# Patient Record
Sex: MALE | Race: WHITE | NOT HISPANIC OR LATINO | Employment: FULL TIME | URBAN - METROPOLITAN AREA
[De-identification: names, ages, dates, MRNs, and addresses within clinical notes are randomized per-mention and may not be internally consistent; named-entity substitution may affect disease eponyms.]

---

## 2019-09-03 ENCOUNTER — OFFICE VISIT (OUTPATIENT)
Dept: FAMILY MEDICINE CLINIC | Facility: CLINIC | Age: 54
End: 2019-09-03

## 2019-09-03 VITALS
TEMPERATURE: 97.6 F | OXYGEN SATURATION: 97 % | SYSTOLIC BLOOD PRESSURE: 121 MMHG | HEIGHT: 70 IN | BODY MASS INDEX: 24.62 KG/M2 | DIASTOLIC BLOOD PRESSURE: 58 MMHG | WEIGHT: 172 LBS | HEART RATE: 66 BPM

## 2019-09-03 DIAGNOSIS — M54.12 CERVICAL NEURITIS: ICD-10-CM

## 2019-09-03 DIAGNOSIS — R63.5 WEIGHT GAIN: ICD-10-CM

## 2019-09-03 DIAGNOSIS — B35.3 TINEA PEDIS OF RIGHT FOOT: ICD-10-CM

## 2019-09-03 DIAGNOSIS — Z00.00 HEALTHCARE MAINTENANCE: Primary | ICD-10-CM

## 2019-09-03 PROBLEM — F43.22 ADJUSTMENT DISORDER WITH ANXIETY: Status: ACTIVE | Noted: 2019-09-03

## 2019-09-03 PROCEDURE — 90632 HEPA VACCINE ADULT IM: CPT | Performed by: FAMILY MEDICINE

## 2019-09-03 PROCEDURE — 90471 IMMUNIZATION ADMIN: CPT | Performed by: FAMILY MEDICINE

## 2019-09-03 PROCEDURE — 99396 PREV VISIT EST AGE 40-64: CPT | Performed by: FAMILY MEDICINE

## 2019-09-03 RX ORDER — GABAPENTIN 600 MG/1
600 TABLET ORAL 3 TIMES DAILY
Refills: 1 | COMMUNITY
Start: 2019-07-07 | End: 2020-03-03 | Stop reason: SDUPTHER

## 2019-09-03 RX ORDER — ESCITALOPRAM OXALATE 10 MG/1
10 TABLET ORAL DAILY
Refills: 1 | COMMUNITY
Start: 2019-08-27 | End: 2019-10-15 | Stop reason: SDUPTHER

## 2019-09-03 NOTE — PROGRESS NOTES
Subjective   Jag Roche is a 54 y.o. male who presents for annual male wellness exam.  Chief Complaint   Patient presents with   • Annual Exam     Here for his routine annual exam.  He has some paperwork needs filled out for work.  As far as his cervical neuritis he still gets by fine with 1 gabapentin daily.  He has no complaints today even his anxiety is still under good control with the Lexapro.  He denies any suicidal homicidal ideation.  He is doing well except for a little more sedentary lifestyle with work.  He has gained a little weight from it.  Sexual History:  Yes, No ED  Contraception: Partner hysterectomy  Diet: healthy  Exercise: Yes  Do you feel safe? yes  Have you ever been abused? no  Last dental exam: this year  Eye exam: this year    Colon Cancer Screening: cscope at 49  PSA: 4/19      Immunization History   Administered Date(s) Administered   • Flu Vaccine Intradermal Quad 18-64YR 09/01/2016, 11/06/2017, 10/17/2018   • Hepatitis A 08/02/2018       The following portions of the patient's history were reviewed and updated as appropriate: allergies, current medications, past family history, past medical history, past social history, past surgical history and problem list.    Past Medical History:   Diagnosis Date   • Acute bronchitis    • Acute sinusitis    • Acute upper respiratory infection    • Adjustment disorder with anxiety    • Allergic rhinitis    • Cervical neuritis    • Chest pain    • Dark stools    • Degenerative lumbar disc    • Diarrhea    • Dyspepsia    • Food sticks on swallowing    • H/O degenerative disc disease    • History of renal calculi    • Increased urinary frequency    • Long-term use of high-risk medication    • Lumbar radiculitis    • Nocturia    • Pain in joint of right shoulder    • Pain in joint, hand    • Right shoulder pain    • Subconjunctival hemorrhage    • Swelling of hand joint, right    • Urinary frequency    • Vitamin B12 deficiency    • [place] teeth  extracted        History reviewed. No pertinent surgical history.    Family History   Problem Relation Age of Onset   • Other Mother         brain stem tumor   • Hypertension Mother    • Diabetes Mother    • Lung cancer Father    • Cancer Other         urinary   • Colon cancer Other        Social History     Socioeconomic History   • Marital status:      Spouse name: Not on file   • Number of children: Not on file   • Years of education: Not on file   • Highest education level: Not on file   Tobacco Use   • Smoking status: Never Smoker         Current Outpatient Medications:   •  escitalopram (LEXAPRO) 10 MG tablet, Take 10 mg by mouth Daily., Disp: , Rfl: 1  •  gabapentin (NEURONTIN) 600 MG tablet, Take 600 mg by mouth 3 (Three) Times a Day., Disp: , Rfl: 1    Review of Systems   Constitutional: Negative for chills, fatigue and fever.   HENT: Negative for congestion, rhinorrhea and sore throat.    Respiratory: Negative for cough and shortness of breath.    Cardiovascular: Negative for chest pain and leg swelling.   Gastrointestinal: Negative for abdominal pain.   Endocrine: Negative for polydipsia and polyuria.   Genitourinary: Negative for dysuria.   Musculoskeletal: Positive for neck pain and neck stiffness. Negative for arthralgias and myalgias.   Skin: Positive for rash.   Neurological: Negative for dizziness.   Hematological: Does not bruise/bleed easily.   Psychiatric/Behavioral: Negative for sleep disturbance.       Objective   Vitals:    09/03/19 1413   BP: 121/58   Pulse: 66   Temp: 97.6 °F (36.4 °C)   SpO2: 97%     Body mass index is 24.68 kg/m².  Physical Exam   Constitutional: He is oriented to person, place, and time. He appears well-developed and well-nourished. No distress.   HENT:   Head: Normocephalic and atraumatic.   Mouth/Throat: Oropharynx is clear and moist.   Eyes: Conjunctivae are normal. Pupils are equal, round, and reactive to light.   Neck: Neck supple. No thyromegaly present.    Cardiovascular: Normal rate and regular rhythm.   No murmur heard.  Pulmonary/Chest: Effort normal and breath sounds normal.   Musculoskeletal: He exhibits no edema.   Neurological: He is alert and oriented to person, place, and time.   Skin: Skin is warm and dry. Rash noted.   Erythema and cracking right foot interdigital space   Psychiatric: He has a normal mood and affect.         Assessment/Plan   Jag was seen today for annual exam.    Diagnoses and all orders for this visit:    Healthcare maintenance  -     Comprehensive Metabolic Panel  -     CBC & Differential  -     Lipid Panel  -     Hepatitis A Vaccine Adult IM  -     TSH    Cervical neuritis    Weight gain  -     TSH    Tinea pedis of right foot               Discussed the importance of maintaining a healthy weight and getting regular exercise.  Educated patient on the benefits of healthy diet.  Advise follow-up annually for wellness exams.    EMR Dragon/transcription disclaimer:  Much of this encounter note is an electronic transcription of spoken language to printed text.  The electronic translation of spoken language may permit errors, omissions or at times nonsensical words or phrases to be inadvertently transcribed.  Although I have reviewed the note for such errors, some may still exist.  Please contact the office for any clarification.

## 2019-09-04 LAB
ALBUMIN SERPL-MCNC: 4.4 G/DL (ref 3.5–5.5)
ALBUMIN/GLOB SERPL: 2 {RATIO} (ref 1.2–2.2)
ALP SERPL-CCNC: 71 IU/L (ref 39–117)
ALT SERPL-CCNC: 9 IU/L (ref 0–44)
AST SERPL-CCNC: 23 IU/L (ref 0–40)
BASOPHILS # BLD AUTO: 0 X10E3/UL (ref 0–0.2)
BASOPHILS NFR BLD AUTO: 0 %
BILIRUB SERPL-MCNC: 0.3 MG/DL (ref 0–1.2)
BUN SERPL-MCNC: 29 MG/DL (ref 6–24)
BUN/CREAT SERPL: 29 (ref 9–20)
CALCIUM SERPL-MCNC: 9.4 MG/DL (ref 8.7–10.2)
CHLORIDE SERPL-SCNC: 104 MMOL/L (ref 96–106)
CHOLEST SERPL-MCNC: 191 MG/DL (ref 100–199)
CO2 SERPL-SCNC: 26 MMOL/L (ref 20–29)
CREAT SERPL-MCNC: 1 MG/DL (ref 0.76–1.27)
EOSINOPHIL # BLD AUTO: 0.1 X10E3/UL (ref 0–0.4)
EOSINOPHIL NFR BLD AUTO: 2 %
ERYTHROCYTE [DISTWIDTH] IN BLOOD BY AUTOMATED COUNT: 13 % (ref 12.3–15.4)
GLOBULIN SER CALC-MCNC: 2.2 G/DL (ref 1.5–4.5)
GLUCOSE SERPL-MCNC: 87 MG/DL (ref 65–99)
HCT VFR BLD AUTO: 40 % (ref 37.5–51)
HDLC SERPL-MCNC: 59 MG/DL
HGB BLD-MCNC: 14 G/DL (ref 13–17.7)
IMM GRANULOCYTES # BLD AUTO: 0 X10E3/UL (ref 0–0.1)
IMM GRANULOCYTES NFR BLD AUTO: 0 %
LDLC SERPL CALC-MCNC: 103 MG/DL (ref 0–99)
LYMPHOCYTES # BLD AUTO: 1.6 X10E3/UL (ref 0.7–3.1)
LYMPHOCYTES NFR BLD AUTO: 27 %
MCH RBC QN AUTO: 33.5 PG (ref 26.6–33)
MCHC RBC AUTO-ENTMCNC: 35 G/DL (ref 31.5–35.7)
MCV RBC AUTO: 96 FL (ref 79–97)
MONOCYTES # BLD AUTO: 0.5 X10E3/UL (ref 0.1–0.9)
MONOCYTES NFR BLD AUTO: 8 %
NEUTROPHILS # BLD AUTO: 3.8 X10E3/UL (ref 1.4–7)
NEUTROPHILS NFR BLD AUTO: 63 %
PLATELET # BLD AUTO: 254 X10E3/UL (ref 150–450)
POTASSIUM SERPL-SCNC: 5.1 MMOL/L (ref 3.5–5.2)
PROT SERPL-MCNC: 6.6 G/DL (ref 6–8.5)
RBC # BLD AUTO: 4.18 X10E6/UL (ref 4.14–5.8)
SODIUM SERPL-SCNC: 143 MMOL/L (ref 134–144)
TRIGL SERPL-MCNC: 144 MG/DL (ref 0–149)
TSH SERPL DL<=0.005 MIU/L-ACNC: 1.02 UIU/ML (ref 0.45–4.5)
VLDLC SERPL CALC-MCNC: 29 MG/DL (ref 5–40)
WBC # BLD AUTO: 6 X10E3/UL (ref 3.4–10.8)

## 2019-10-15 RX ORDER — ESCITALOPRAM OXALATE 10 MG/1
10 TABLET ORAL DAILY
Qty: 90 TABLET | Refills: 0 | Status: SHIPPED | OUTPATIENT
Start: 2019-10-15 | End: 2020-03-03 | Stop reason: SDUPTHER

## 2020-03-03 ENCOUNTER — OFFICE VISIT (OUTPATIENT)
Dept: FAMILY MEDICINE CLINIC | Facility: CLINIC | Age: 55
End: 2020-03-03

## 2020-03-03 VITALS
TEMPERATURE: 98.4 F | OXYGEN SATURATION: 97 % | BODY MASS INDEX: 25.74 KG/M2 | HEIGHT: 70 IN | HEART RATE: 72 BPM | DIASTOLIC BLOOD PRESSURE: 70 MMHG | SYSTOLIC BLOOD PRESSURE: 120 MMHG | WEIGHT: 179.8 LBS

## 2020-03-03 DIAGNOSIS — S50.02XA CONTUSION OF LEFT ELBOW, INITIAL ENCOUNTER: ICD-10-CM

## 2020-03-03 DIAGNOSIS — M54.12 CERVICAL NEURITIS: Primary | ICD-10-CM

## 2020-03-03 DIAGNOSIS — Z79.899 LONG-TERM USE OF HIGH-RISK MEDICATION: ICD-10-CM

## 2020-03-03 DIAGNOSIS — F43.22 ADJUSTMENT DISORDER WITH ANXIETY: ICD-10-CM

## 2020-03-03 PROCEDURE — 99214 OFFICE O/P EST MOD 30 MIN: CPT | Performed by: FAMILY MEDICINE

## 2020-03-03 RX ORDER — GABAPENTIN 600 MG/1
600 TABLET ORAL 3 TIMES DAILY
Qty: 270 TABLET | Refills: 1 | Status: SHIPPED | OUTPATIENT
Start: 2020-03-03 | End: 2021-07-29 | Stop reason: SDUPTHER

## 2020-03-03 RX ORDER — ESCITALOPRAM OXALATE 10 MG/1
10 TABLET ORAL DAILY
Qty: 90 TABLET | Refills: 1 | Status: SHIPPED | OUTPATIENT
Start: 2020-03-03 | End: 2020-03-03 | Stop reason: SDUPTHER

## 2020-03-03 RX ORDER — ESCITALOPRAM OXALATE 10 MG/1
10 TABLET ORAL DAILY
Qty: 90 TABLET | Refills: 1 | Status: SHIPPED | OUTPATIENT
Start: 2020-03-03 | End: 2020-10-21

## 2020-03-03 NOTE — PROGRESS NOTES
Subjective   Jag Roche is a 54 y.o. male.     Chief Complaint   Patient presents with   • Follow-up     gabapentin for neck   • Joint Swelling     left, fell when playing Volleyball 3 wks ago        Patient here to follow-up on his cervical neuritis.  His symptoms are still well controlled on the gabapentin.  He does not usually take it 3 times a day but does mostly twice a day.  He is due for his physical this fall.  He has no complaints of any side effects.  He did fall on his left elbow playing volleyball a few weeks ago.  His wife was concerned and brought up to me when she was in.  It still hurts a little bit but he can almost straighten it all the way now.  He can straighten it much at first.  It did not swell significantly.  He is landed on his elbows in the past and has had significant olecranon bursitis previously.         The following portions of the patient's history were reviewed and updated as appropriate: allergies, current medications, past family history, past medical history, past social history, past surgical history and problem list.    Past Medical History:   Diagnosis Date   • Acute bronchitis    • Acute sinusitis    • Acute upper respiratory infection    • Adjustment disorder with anxiety    • Allergic rhinitis    • Cervical neuritis    • Chest pain    • Dark stools    • Degenerative lumbar disc    • Diarrhea    • Dyspepsia    • Food sticks on swallowing    • H/O degenerative disc disease    • History of renal calculi    • Increased urinary frequency    • Long-term use of high-risk medication    • Lumbar radiculitis    • Nocturia    • Pain in joint of right shoulder    • Pain in joint, hand    • Right shoulder pain    • Subconjunctival hemorrhage    • Swelling of hand joint, right    • Urinary frequency    • Vitamin B12 deficiency    • [place] teeth extracted        History reviewed. No pertinent surgical history.    Family History   Problem Relation Age of Onset   • Other Mother          "brain stem tumor   • Hypertension Mother    • Diabetes Mother    • Lung cancer Father    • Cancer Other         urinary   • Colon cancer Other        Social History     Socioeconomic History   • Marital status:      Spouse name: Not on file   • Number of children: Not on file   • Years of education: Not on file   • Highest education level: Not on file   Tobacco Use   • Smoking status: Never Smoker         Current Outpatient Medications:   •  escitalopram (LEXAPRO) 10 MG tablet, Take 1 tablet by mouth Daily., Disp: 90 tablet, Rfl: 1  •  gabapentin (NEURONTIN) 600 MG tablet, Take 1 tablet by mouth 3 (Three) Times a Day., Disp: 270 tablet, Rfl: 1    Review of Systems   Constitutional: Negative for chills, fatigue and fever.   HENT: Negative for congestion, rhinorrhea and sore throat.    Respiratory: Negative for cough and shortness of breath.    Cardiovascular: Negative for chest pain and leg swelling.   Gastrointestinal: Negative for abdominal pain.   Endocrine: Negative for polydipsia and polyuria.   Genitourinary: Negative for dysuria.   Musculoskeletal: Positive for arthralgias. Negative for myalgias.   Skin: Negative for rash.   Neurological: Positive for numbness. Negative for dizziness.   Hematological: Does not bruise/bleed easily.   Psychiatric/Behavioral: Negative for sleep disturbance.       Objective   Vitals:    03/03/20 1527   BP: 120/70   Pulse: 72   Temp: 98.4 °F (36.9 °C)   SpO2: 97%   Weight: 81.6 kg (179 lb 12.8 oz)   Height: 177.8 cm (70\")     Body mass index is 25.8 kg/m².  Physical Exam   Constitutional: He is oriented to person, place, and time. He appears well-developed and well-nourished. No distress.   HENT:   Head: Normocephalic and atraumatic.   Mouth/Throat: Oropharynx is clear and moist.   Eyes: Pupils are equal, round, and reactive to light. Conjunctivae are normal.   Neck: Neck supple. No thyromegaly present.   Cardiovascular: Normal rate and regular rhythm.   No murmur " heard.  Pulmonary/Chest: Effort normal and breath sounds normal.   Abdominal: Soft. Bowel sounds are normal.   Musculoskeletal: He exhibits no edema.   Neck with full range of motion.    Left elbow almost full range of motion and somewhat tender over olecranon but not at the process.  No edema no discoloration.  There is no swelling of the bursa.  Good strength of the elbow.   Neurological: He is alert and oriented to person, place, and time. He displays normal reflexes. No cranial nerve deficit or sensory deficit. He exhibits normal muscle tone. Coordination normal.   Skin: Skin is warm and dry.   Psychiatric: He has a normal mood and affect.         Assessment/Plan   Jag was seen today for follow-up and joint swelling.    Diagnoses and all orders for this visit:    Cervical neuritis  -     gabapentin (NEURONTIN) 600 MG tablet; Take 1 tablet by mouth 3 (Three) Times a Day.    Adjustment disorder with anxiety  -     escitalopram (LEXAPRO) 10 MG tablet; Take 1 tablet by mouth Daily.    Long-term use of high-risk medication    Contusion of left elbow, initial encounter               Patient Instructions   Keep working on range of motion on left elbow.    If you cannot get it fully extended in the next few weeks, let me know and we can get an elbow x-ray.    Annual next visit.

## 2020-08-10 ENCOUNTER — TELEMEDICINE (OUTPATIENT)
Dept: FAMILY MEDICINE CLINIC | Facility: CLINIC | Age: 55
End: 2020-08-10

## 2020-08-10 DIAGNOSIS — J01.00 ACUTE NON-RECURRENT MAXILLARY SINUSITIS: Primary | ICD-10-CM

## 2020-08-10 PROCEDURE — 99213 OFFICE O/P EST LOW 20 MIN: CPT | Performed by: FAMILY MEDICINE

## 2020-08-10 RX ORDER — FLUTICASONE PROPIONATE 50 MCG
2 SPRAY, SUSPENSION (ML) NASAL DAILY
Qty: 16 G | Refills: 0 | Status: SHIPPED | OUTPATIENT
Start: 2020-08-10 | End: 2020-10-22

## 2020-08-10 RX ORDER — DOXYCYCLINE 100 MG/1
100 CAPSULE ORAL 2 TIMES DAILY
Qty: 20 CAPSULE | Refills: 0 | Status: SHIPPED | OUTPATIENT
Start: 2020-08-10 | End: 2020-10-22

## 2020-08-10 NOTE — PROGRESS NOTES
This was an audio and video enabled telemedicine encounter.  Length of visit 15 minutes,    Subjective   Jag Roche is a 55 y.o. male.     Chief Complaint   Patient presents with   • Cough     slight cough   • Sinus Problem     states he has had been having drainage x1 day        Presents for video visit during global pandemic.  He complains of the past couple of weeks of postnasal drip rhinorrhea sinus pressure and cough.  He denies any fever or chills headache or myalgias.  He has not had any exposures to cope with a is aware of.  He usually gets sinus infections a couple times a year.         The following portions of the patient's history were reviewed and updated as appropriate: allergies, current medications, past family history, past medical history, past social history, past surgical history and problem list.    Past Medical History:   Diagnosis Date   • Acute bronchitis    • Acute sinusitis    • Acute upper respiratory infection    • Adjustment disorder with anxiety    • Allergic rhinitis    • Cervical neuritis    • Chest pain    • Dark stools    • Degenerative lumbar disc    • Diarrhea    • Dyspepsia    • Food sticks on swallowing    • H/O degenerative disc disease    • History of renal calculi    • Increased urinary frequency    • Long-term use of high-risk medication    • Lumbar radiculitis    • Nocturia    • Pain in joint of right shoulder    • Pain in joint, hand    • Right shoulder pain    • Subconjunctival hemorrhage    • Swelling of hand joint, right    • Urinary frequency    • Vitamin B12 deficiency    • [place] teeth extracted        History reviewed. No pertinent surgical history.    Family History   Problem Relation Age of Onset   • Other Mother         brain stem tumor   • Hypertension Mother    • Diabetes Mother    • Lung cancer Father    • Cancer Other         urinary   • Colon cancer Other        Social History     Socioeconomic History   • Marital status:      Spouse name: Not on  file   • Number of children: Not on file   • Years of education: Not on file   • Highest education level: Not on file   Tobacco Use   • Smoking status: Never Smoker   • Smokeless tobacco: Never Used   Substance and Sexual Activity   • Alcohol use: Yes     Frequency: Never         Current Outpatient Medications:   •  escitalopram (LEXAPRO) 10 MG tablet, Take 1 tablet by mouth Daily., Disp: 90 tablet, Rfl: 1  •  gabapentin (NEURONTIN) 600 MG tablet, Take 1 tablet by mouth 3 (Three) Times a Day., Disp: 270 tablet, Rfl: 1  •  doxycycline (MONODOX) 100 MG capsule, Take 1 capsule by mouth 2 (Two) Times a Day., Disp: 20 capsule, Rfl: 0  •  fluticasone (Flonase) 50 MCG/ACT nasal spray, 2 sprays into the nostril(s) as directed by provider Daily., Disp: 16 g, Rfl: 0    Review of Systems   Constitutional: Negative for chills, fatigue and fever.   HENT: Positive for postnasal drip, rhinorrhea, sinus pressure and sinus pain. Negative for congestion and sore throat.    Respiratory: Positive for cough. Negative for shortness of breath.    Cardiovascular: Negative for chest pain and leg swelling.   Gastrointestinal: Negative for abdominal pain.   Endocrine: Negative for polydipsia and polyuria.   Genitourinary: Negative for dysuria.   Musculoskeletal: Negative for arthralgias and myalgias.   Skin: Negative for rash.   Neurological: Negative for dizziness.   Hematological: Does not bruise/bleed easily.   Psychiatric/Behavioral: Negative for sleep disturbance.       Objective   There were no vitals filed for this visit.  There is no height or weight on file to calculate BMI.  Physical Exam   Constitutional: He is oriented to person, place, and time. He appears well-developed and well-nourished. No distress.   HENT:   Head: Normocephalic and atraumatic.   Eyes: Pupils are equal, round, and reactive to light. Conjunctivae are normal.   Pulmonary/Chest: Effort normal. No respiratory distress.   Neurological: He is alert and oriented to  person, place, and time.   Psychiatric: He has a normal mood and affect.         Assessment/Plan   Jag was seen today for cough and sinus problem.    Diagnoses and all orders for this visit:    Acute non-recurrent maxillary sinusitis  -     doxycycline (MONODOX) 100 MG capsule; Take 1 capsule by mouth 2 (Two) Times a Day.  -     fluticasone (Flonase) 50 MCG/ACT nasal spray; 2 sprays into the nostril(s) as directed by provider Daily.               There are no Patient Instructions on file for this visit.

## 2020-10-21 DIAGNOSIS — F43.22 ADJUSTMENT DISORDER WITH ANXIETY: ICD-10-CM

## 2020-10-21 RX ORDER — ESCITALOPRAM OXALATE 10 MG/1
TABLET ORAL
Qty: 90 TABLET | Refills: 1 | Status: SHIPPED | OUTPATIENT
Start: 2020-10-21 | End: 2021-05-05

## 2020-10-22 ENCOUNTER — OFFICE VISIT (OUTPATIENT)
Dept: FAMILY MEDICINE CLINIC | Facility: CLINIC | Age: 55
End: 2020-10-22

## 2020-10-22 VITALS
HEIGHT: 70 IN | DIASTOLIC BLOOD PRESSURE: 68 MMHG | HEART RATE: 69 BPM | TEMPERATURE: 98 F | WEIGHT: 173.2 LBS | BODY MASS INDEX: 24.79 KG/M2 | SYSTOLIC BLOOD PRESSURE: 116 MMHG | OXYGEN SATURATION: 98 %

## 2020-10-22 DIAGNOSIS — F43.22 ADJUSTMENT DISORDER WITH ANXIETY: ICD-10-CM

## 2020-10-22 DIAGNOSIS — R35.1 NOCTURIA: ICD-10-CM

## 2020-10-22 DIAGNOSIS — Z12.5 SCREENING FOR PROSTATE CANCER: ICD-10-CM

## 2020-10-22 DIAGNOSIS — Z79.899 LONG-TERM USE OF HIGH-RISK MEDICATION: ICD-10-CM

## 2020-10-22 DIAGNOSIS — Z11.59 NEED FOR HEPATITIS C SCREENING TEST: ICD-10-CM

## 2020-10-22 DIAGNOSIS — Z00.00 ROUTINE HEALTH MAINTENANCE: Primary | ICD-10-CM

## 2020-10-22 DIAGNOSIS — M54.12 CERVICAL NEURITIS: ICD-10-CM

## 2020-10-22 PROCEDURE — 99213 OFFICE O/P EST LOW 20 MIN: CPT | Performed by: FAMILY MEDICINE

## 2020-10-22 PROCEDURE — 99396 PREV VISIT EST AGE 40-64: CPT | Performed by: FAMILY MEDICINE

## 2020-10-22 NOTE — PROGRESS NOTES
Subjective   Jag Roche is a 55 y.o. male who presents for annual male wellness exam.  Chief Complaint   Patient presents with   • Annual Exam     Patient doing well in general.  He is taking his Lexapro for his anxiety and his gabapentin for his neuritis.  Usually only takes it once a day.  He has a spot on his back he wants me to look at.  Itches like a scaly spot.  He denies any new complaints today.     Sexual History: yes   Contraception: na  Diet: standard  Exercise: yes, playing volleyball  Last dental exam: up to date  Eye exam: up to date    Colon Cancer Screening: up to date  PSA: due      Immunization History   Administered Date(s) Administered   • Flu Vaccine Intradermal Quad 18-64YR 09/01/2016, 11/06/2017, 10/17/2018   • Flu Vaccine Split Quad 10/17/2018   • Hepatitis A 08/02/2018, 09/03/2019   • PPD Test 10/02/2013   • Tdap 06/03/2017   • flucelvax quad pfs =>4 YRS 10/22/2019       The following portions of the patient's history were reviewed and updated as appropriate: allergies, current medications, past family history, past medical history, past social history, past surgical history and problem list.    Past Medical History:   Diagnosis Date   • Acute bronchitis    • Acute sinusitis    • Acute upper respiratory infection    • Adjustment disorder with anxiety    • Allergic rhinitis    • Cervical neuritis    • Chest pain    • Dark stools    • Degenerative lumbar disc    • Diarrhea    • Dyspepsia    • Food sticks on swallowing    • H/O degenerative disc disease    • History of renal calculi    • Increased urinary frequency    • Long-term use of high-risk medication    • Lumbar radiculitis    • Nocturia    • Pain in joint of right shoulder    • Pain in joint, hand    • Right shoulder pain    • Subconjunctival hemorrhage    • Swelling of hand joint, right    • Urinary frequency    • Vitamin B12 deficiency    • [place] teeth extracted        History reviewed. No pertinent surgical history.    Family  History   Problem Relation Age of Onset   • Other Mother         brain stem tumor   • Hypertension Mother    • Diabetes Mother    • Lung cancer Father    • Cancer Other         urinary   • Colon cancer Other        Social History     Socioeconomic History   • Marital status:      Spouse name: Not on file   • Number of children: Not on file   • Years of education: Not on file   • Highest education level: Not on file   Tobacco Use   • Smoking status: Never Smoker   • Smokeless tobacco: Never Used   Substance and Sexual Activity   • Alcohol use: Yes     Frequency: Never         Current Outpatient Medications:   •  escitalopram (LEXAPRO) 10 MG tablet, Take 1 tablet by mouth once daily, Disp: 90 tablet, Rfl: 1  •  gabapentin (NEURONTIN) 600 MG tablet, Take 1 tablet by mouth 3 (Three) Times a Day., Disp: 270 tablet, Rfl: 1    Review of Systems   Constitutional: Negative for chills, fatigue and fever.   HENT: Negative for congestion, ear pain, rhinorrhea and sore throat.    Eyes: Negative for pain and redness.   Respiratory: Negative for cough, chest tightness and wheezing.    Cardiovascular: Negative for chest pain and leg swelling.   Gastrointestinal: Negative for abdominal pain, constipation, diarrhea, nausea and vomiting.        Gurgling. Bloated, loose to hard stools   Endocrine: Negative for polydipsia and polyphagia.   Genitourinary: Negative for difficulty urinating, dysuria and hematuria.   Musculoskeletal: Negative for arthralgias and myalgias.   Skin: Negative for color change and rash.   Allergic/Immunologic: Negative.    Neurological: Negative for dizziness, weakness, light-headedness and headaches.   Hematological: Negative.    Psychiatric/Behavioral: Negative for confusion, dysphoric mood and sleep disturbance.       Objective   Vitals:    10/22/20 1435   BP: 116/68   Pulse: 69   Temp: 98 °F (36.7 °C)   SpO2: 98%     Body mass index is 24.85 kg/m².  Physical Exam  Vitals signs and nursing note  reviewed.   Constitutional:       General: He is not in acute distress.     Appearance: Normal appearance. He is well-developed.   HENT:      Head: Normocephalic and atraumatic.      Right Ear: Tympanic membrane, ear canal and external ear normal.      Left Ear: Tympanic membrane, ear canal and external ear normal.      Nose: Nose normal.      Mouth/Throat:      Mouth: Mucous membranes are moist.      Pharynx: Oropharynx is clear.   Eyes:      Conjunctiva/sclera: Conjunctivae normal.      Pupils: Pupils are equal, round, and reactive to light.   Neck:      Musculoskeletal: Neck supple.      Thyroid: No thyromegaly.   Cardiovascular:      Rate and Rhythm: Normal rate and regular rhythm.      Heart sounds: No murmur.   Pulmonary:      Effort: Pulmonary effort is normal.      Breath sounds: Normal breath sounds.   Abdominal:      General: Abdomen is flat. Bowel sounds are normal. There is no distension.      Palpations: Abdomen is soft. There is no mass.   Musculoskeletal: Normal range of motion.         General: No swelling or tenderness.   Skin:     General: Skin is warm and dry.      Capillary Refill: Capillary refill takes less than 2 seconds.      Comments: 3 mm red scaly irritated spot on patient's right shoulder blade.  Could be nothing or a small actinic keratosis.  Patient will monitor.   Neurological:      Mental Status: He is alert and oriented to person, place, and time.   Psychiatric:         Mood and Affect: Mood normal.         Behavior: Behavior normal.           Assessment/Plan   Diagnoses and all orders for this visit:    1. Routine health maintenance (Primary)    2. Adjustment disorder with anxiety    3. Cervical neuritis  -     TSH  -     Vitamin B12  -     CBC & Differential  -     Comprehensive Metabolic Panel    4. Long-term use of high-risk medication  -     CBC & Differential  -     Comprehensive Metabolic Panel    5. Nocturia  -     PSA SCREENING    6. Need for hepatitis C screening test  -      Hepatitis C Antibody    7. Screening for prostate cancer  -     PSA SCREENING               Discussed the importance of maintaining a healthy weight and getting regular exercise.  Educated patient on the benefits of healthy diet.  Advise follow-up annually for wellness exams.    Patient Instructions   Try a consistent daily does of fiber.  Keep drinking a lot of water.  Eat lots of fruits and vegetables.  Check on Shingrix (shingles vaccine) coverage.

## 2020-10-23 LAB
ALBUMIN SERPL-MCNC: 4.3 G/DL (ref 3.8–4.9)
ALBUMIN/GLOB SERPL: 1.7 {RATIO} (ref 1.2–2.2)
ALP SERPL-CCNC: 65 IU/L (ref 39–117)
ALT SERPL-CCNC: 6 IU/L (ref 0–44)
AST SERPL-CCNC: 17 IU/L (ref 0–40)
BASOPHILS # BLD AUTO: 0 X10E3/UL (ref 0–0.2)
BASOPHILS NFR BLD AUTO: 0 %
BILIRUB SERPL-MCNC: 0.4 MG/DL (ref 0–1.2)
BUN SERPL-MCNC: 28 MG/DL (ref 6–24)
BUN/CREAT SERPL: 24 (ref 9–20)
CALCIUM SERPL-MCNC: 9.7 MG/DL (ref 8.7–10.2)
CHLORIDE SERPL-SCNC: 104 MMOL/L (ref 96–106)
CO2 SERPL-SCNC: 26 MMOL/L (ref 20–29)
CREAT SERPL-MCNC: 1.18 MG/DL (ref 0.76–1.27)
EOSINOPHIL # BLD AUTO: 0.2 X10E3/UL (ref 0–0.4)
EOSINOPHIL NFR BLD AUTO: 3 %
ERYTHROCYTE [DISTWIDTH] IN BLOOD BY AUTOMATED COUNT: 12.1 % (ref 11.6–15.4)
GLOBULIN SER CALC-MCNC: 2.5 G/DL (ref 1.5–4.5)
GLUCOSE SERPL-MCNC: 81 MG/DL (ref 65–99)
HCT VFR BLD AUTO: 42.8 % (ref 37.5–51)
HCV AB S/CO SERPL IA: <0.1 S/CO RATIO (ref 0–0.9)
HGB BLD-MCNC: 14.8 G/DL (ref 13–17.7)
IMM GRANULOCYTES # BLD AUTO: 0 X10E3/UL (ref 0–0.1)
IMM GRANULOCYTES NFR BLD AUTO: 0 %
LYMPHOCYTES # BLD AUTO: 1.3 X10E3/UL (ref 0.7–3.1)
LYMPHOCYTES NFR BLD AUTO: 23 %
MCH RBC QN AUTO: 33.8 PG (ref 26.6–33)
MCHC RBC AUTO-ENTMCNC: 34.6 G/DL (ref 31.5–35.7)
MCV RBC AUTO: 98 FL (ref 79–97)
MONOCYTES # BLD AUTO: 0.4 X10E3/UL (ref 0.1–0.9)
MONOCYTES NFR BLD AUTO: 7 %
NEUTROPHILS # BLD AUTO: 3.8 X10E3/UL (ref 1.4–7)
NEUTROPHILS NFR BLD AUTO: 67 %
PLATELET # BLD AUTO: 293 X10E3/UL (ref 150–450)
POTASSIUM SERPL-SCNC: 4.7 MMOL/L (ref 3.5–5.2)
PROT SERPL-MCNC: 6.8 G/DL (ref 6–8.5)
PSA SERPL-MCNC: 0.5 NG/ML (ref 0–4)
RBC # BLD AUTO: 4.38 X10E6/UL (ref 4.14–5.8)
SODIUM SERPL-SCNC: 141 MMOL/L (ref 134–144)
TSH SERPL DL<=0.005 MIU/L-ACNC: 0.89 UIU/ML (ref 0.45–4.5)
VIT B12 SERPL-MCNC: 191 PG/ML (ref 232–1245)
WBC # BLD AUTO: 5.7 X10E3/UL (ref 3.4–10.8)

## 2020-10-26 DIAGNOSIS — E53.8 VITAMIN B12 DEFICIENCY: Primary | ICD-10-CM

## 2020-10-30 ENCOUNTER — CLINICAL SUPPORT (OUTPATIENT)
Dept: FAMILY MEDICINE CLINIC | Facility: CLINIC | Age: 55
End: 2020-10-30

## 2020-10-30 VITALS — TEMPERATURE: 97.8 F

## 2020-10-30 DIAGNOSIS — Z23 FLU VACCINE NEED: Primary | ICD-10-CM

## 2020-10-30 PROCEDURE — 90471 IMMUNIZATION ADMIN: CPT | Performed by: FAMILY MEDICINE

## 2020-10-30 PROCEDURE — 90686 IIV4 VACC NO PRSV 0.5 ML IM: CPT | Performed by: FAMILY MEDICINE

## 2020-11-30 ENCOUNTER — RESULTS ENCOUNTER (OUTPATIENT)
Dept: FAMILY MEDICINE CLINIC | Facility: CLINIC | Age: 55
End: 2020-11-30

## 2020-11-30 DIAGNOSIS — E53.8 VITAMIN B12 DEFICIENCY: ICD-10-CM

## 2020-12-05 LAB — VIT B12 SERPL-MCNC: 313 PG/ML (ref 232–1245)

## 2020-12-10 ENCOUNTER — CLINICAL SUPPORT (OUTPATIENT)
Dept: FAMILY MEDICINE CLINIC | Facility: CLINIC | Age: 55
End: 2020-12-10

## 2020-12-10 DIAGNOSIS — E53.8 VITAMIN B12 DEFICIENCY: Primary | ICD-10-CM

## 2020-12-10 PROCEDURE — 96372 THER/PROPH/DIAG INJ SC/IM: CPT | Performed by: FAMILY MEDICINE

## 2020-12-10 RX ORDER — CYANOCOBALAMIN 1000 UG/ML
1000 INJECTION, SOLUTION INTRAMUSCULAR; SUBCUTANEOUS
Status: DISCONTINUED | OUTPATIENT
Start: 2020-12-10 | End: 2021-11-10

## 2020-12-10 RX ADMIN — CYANOCOBALAMIN 1000 MCG: 1000 INJECTION, SOLUTION INTRAMUSCULAR; SUBCUTANEOUS at 15:51

## 2020-12-18 ENCOUNTER — CLINICAL SUPPORT (OUTPATIENT)
Dept: FAMILY MEDICINE CLINIC | Facility: CLINIC | Age: 55
End: 2020-12-18

## 2020-12-18 VITALS — TEMPERATURE: 97.7 F

## 2020-12-18 DIAGNOSIS — E53.8 B12 DEFICIENCY: ICD-10-CM

## 2020-12-18 PROCEDURE — 96372 THER/PROPH/DIAG INJ SC/IM: CPT | Performed by: NURSE PRACTITIONER

## 2020-12-18 RX ADMIN — CYANOCOBALAMIN 1000 MCG: 1000 INJECTION, SOLUTION INTRAMUSCULAR; SUBCUTANEOUS at 15:13

## 2020-12-30 ENCOUNTER — CLINICAL SUPPORT (OUTPATIENT)
Dept: FAMILY MEDICINE CLINIC | Facility: CLINIC | Age: 55
End: 2020-12-30

## 2020-12-30 PROCEDURE — 96372 THER/PROPH/DIAG INJ SC/IM: CPT | Performed by: FAMILY MEDICINE

## 2020-12-30 RX ADMIN — CYANOCOBALAMIN 1000 MCG: 1000 INJECTION, SOLUTION INTRAMUSCULAR; SUBCUTANEOUS at 11:56

## 2021-01-19 ENCOUNTER — CLINICAL SUPPORT (OUTPATIENT)
Dept: FAMILY MEDICINE CLINIC | Facility: CLINIC | Age: 56
End: 2021-01-19

## 2021-01-19 VITALS — TEMPERATURE: 97.8 F

## 2021-01-19 DIAGNOSIS — E53.8 B12 DEFICIENCY: Primary | ICD-10-CM

## 2021-01-19 PROCEDURE — 96372 THER/PROPH/DIAG INJ SC/IM: CPT | Performed by: FAMILY MEDICINE

## 2021-01-19 RX ADMIN — CYANOCOBALAMIN 1000 MCG: 1000 INJECTION, SOLUTION INTRAMUSCULAR; SUBCUTANEOUS at 15:36

## 2021-01-26 ENCOUNTER — TELEPHONE (OUTPATIENT)
Dept: FAMILY MEDICINE CLINIC | Facility: CLINIC | Age: 56
End: 2021-01-26

## 2021-02-01 ENCOUNTER — LAB (OUTPATIENT)
Dept: FAMILY MEDICINE CLINIC | Facility: CLINIC | Age: 56
End: 2021-02-01

## 2021-02-01 VITALS — TEMPERATURE: 97.3 F

## 2021-02-01 DIAGNOSIS — E53.8 LOW SERUM VITAMIN B12: Primary | ICD-10-CM

## 2021-02-02 LAB — VIT B12 SERPL-MCNC: 344 PG/ML (ref 232–1245)

## 2021-03-30 ENCOUNTER — BULK ORDERING (OUTPATIENT)
Dept: CASE MANAGEMENT | Facility: OTHER | Age: 56
End: 2021-03-30

## 2021-03-30 DIAGNOSIS — Z23 IMMUNIZATION DUE: ICD-10-CM

## 2021-03-31 NOTE — PATIENT INSTRUCTIONS
Try a consistent daily does of fiber.  Keep drinking a lot of water.  Eat lots of fruits and vegetables.  Check on Shingrix (shingles vaccine) coverage.     
positive S1/positive S2

## 2021-04-27 ENCOUNTER — OFFICE VISIT (OUTPATIENT)
Dept: FAMILY MEDICINE CLINIC | Facility: CLINIC | Age: 56
End: 2021-04-27

## 2021-04-27 VITALS
WEIGHT: 169.8 LBS | OXYGEN SATURATION: 98 % | BODY MASS INDEX: 24.31 KG/M2 | DIASTOLIC BLOOD PRESSURE: 66 MMHG | SYSTOLIC BLOOD PRESSURE: 124 MMHG | HEIGHT: 70 IN | HEART RATE: 62 BPM | TEMPERATURE: 98.2 F

## 2021-04-27 DIAGNOSIS — F43.22 ADJUSTMENT DISORDER WITH ANXIETY: Chronic | ICD-10-CM

## 2021-04-27 DIAGNOSIS — M54.12 CERVICAL NEURITIS: Chronic | ICD-10-CM

## 2021-04-27 DIAGNOSIS — Z79.899 LONG-TERM USE OF HIGH-RISK MEDICATION: Chronic | ICD-10-CM

## 2021-04-27 DIAGNOSIS — E53.8 VITAMIN B12 DEFICIENCY: Chronic | ICD-10-CM

## 2021-04-27 DIAGNOSIS — Z00.00 ROUTINE HEALTH MAINTENANCE: Primary | ICD-10-CM

## 2021-04-27 PROCEDURE — 99396 PREV VISIT EST AGE 40-64: CPT | Performed by: FAMILY MEDICINE

## 2021-04-27 PROCEDURE — 99213 OFFICE O/P EST LOW 20 MIN: CPT | Performed by: FAMILY MEDICINE

## 2021-04-27 RX ORDER — NAPROXEN SODIUM 220 MG
220 TABLET ORAL 2 TIMES DAILY PRN
COMMUNITY

## 2021-04-27 RX ORDER — LANOLIN ALCOHOL/MO/W.PET/CERES
1000 CREAM (GRAM) TOPICAL DAILY
COMMUNITY
End: 2023-02-14

## 2021-04-27 NOTE — PROGRESS NOTES
Subjective   Jag Roche is a 55 y.o. male who presents for annual male wellness exam.  Chief Complaint   Patient presents with   • Annual Exam   Patient presents for his annual and for follow-up of his cervical neuritis.  His gabapentin still helps him considerably.  He does have a concern about the B12 injections.  He felt he could not get in here often enough so he just increase his oral dose until he can get his labs today.  As far as his mood, the Lexapro is still doing his job.  He feels his anxiety is under control and he has no thoughts of self-harm or harming others.      Sexual History: ok, with wife, no ED   Contraception: na  Diet: standard  Exercise: playing volleyball  Last dental exam: up to date  Eye exam: overdue    Colon Cancer Screening: up to date  PSA: 0.65 on 10/2020      Immunization History   Administered Date(s) Administered   • Flu Vaccine Intradermal Quad 18-64YR 09/01/2016, 11/06/2017, 10/17/2018   • Flu Vaccine Split Quad 10/17/2018   • Flulaval/Fluarix/Fluzone Quad 10/30/2020   • Hepatitis A 08/02/2018, 09/03/2019   • PPD Test 10/02/2013   • Tdap 06/03/2017   • flucelvax quad pfs =>4 YRS 10/22/2019       The following portions of the patient's history were reviewed and updated as appropriate: allergies, current medications, past family history, past medical history, past social history, past surgical history and problem list.    Past Medical History:   Diagnosis Date   • Acute bronchitis    • Acute sinusitis    • Acute upper respiratory infection    • Adjustment disorder with anxiety    • Allergic rhinitis    • Cervical neuritis    • Chest pain    • Dark stools    • Degenerative lumbar disc    • Diarrhea    • Dyspepsia    • Food sticks on swallowing    • H/O degenerative disc disease    • History of renal calculi    • Increased urinary frequency    • Long-term use of high-risk medication    • Lumbar radiculitis    • Nocturia    • Pain in joint of right shoulder    • Pain in joint,  hand    • Right shoulder pain    • Subconjunctival hemorrhage    • Swelling of hand joint, right    • Urinary frequency    • Vitamin B12 deficiency    • [place] teeth extracted        History reviewed. No pertinent surgical history.    Family History   Problem Relation Age of Onset   • Other Mother         brain stem tumor   • Hypertension Mother    • Diabetes Mother    • Lung cancer Father    • Cancer Other         urinary   • Colon cancer Other        Social History     Socioeconomic History   • Marital status:      Spouse name: Not on file   • Number of children: Not on file   • Years of education: Not on file   • Highest education level: Not on file   Tobacco Use   • Smoking status: Never Smoker   • Smokeless tobacco: Never Used   Substance and Sexual Activity   • Alcohol use: Yes         Current Outpatient Medications:   •  escitalopram (LEXAPRO) 10 MG tablet, Take 1 tablet by mouth once daily, Disp: 90 tablet, Rfl: 1  •  gabapentin (NEURONTIN) 600 MG tablet, Take 1 tablet by mouth 3 (Three) Times a Day., Disp: 270 tablet, Rfl: 1  •  naproxen sodium (ALEVE) 220 MG tablet, Take 220 mg by mouth 2 (Two) Times a Day As Needed., Disp: , Rfl:   •  vitamin B-12 (CYANOCOBALAMIN) 1000 MCG tablet, Take 1,000 mcg by mouth Daily., Disp: , Rfl:     Current Facility-Administered Medications:   •  cyanocobalamin injection 1,000 mcg, 1,000 mcg, Intramuscular, Q28 Days, Kehrer, Meredith Lea, MD, 1,000 mcg at 01/19/21 1536    Review of Systems   Constitutional: Negative for chills, fatigue and fever.   HENT: Negative for congestion, ear pain, rhinorrhea and sore throat.    Eyes: Negative for pain and redness.   Respiratory: Negative for cough, chest tightness and wheezing.    Cardiovascular: Negative for chest pain and leg swelling.   Gastrointestinal: Negative for abdominal pain, constipation, diarrhea, nausea and vomiting.   Endocrine: Negative for polydipsia and polyphagia.   Genitourinary: Negative for dysuria and  hematuria.   Musculoskeletal: Positive for neck pain. Negative for arthralgias and myalgias.   Skin: Negative for color change and rash.   Allergic/Immunologic: Negative.    Neurological: Negative for dizziness, weakness, light-headedness and headaches.   Hematological: Negative.    Psychiatric/Behavioral: Negative for confusion, dysphoric mood and sleep disturbance.       Objective   Vitals:    04/27/21 1524   BP: 124/66   Pulse: 62   Temp: 98.2 °F (36.8 °C)   SpO2: 98%     Body mass index is 24.36 kg/m².  Physical Exam  Vitals and nursing note reviewed.   Constitutional:       General: He is not in acute distress.     Appearance: Normal appearance. He is well-developed.   HENT:      Head: Normocephalic and atraumatic.      Right Ear: Tympanic membrane, ear canal and external ear normal.      Left Ear: Tympanic membrane, ear canal and external ear normal.      Nose: Nose normal.      Mouth/Throat:      Mouth: Mucous membranes are moist.      Pharynx: Oropharynx is clear.   Eyes:      Conjunctiva/sclera: Conjunctivae normal.      Pupils: Pupils are equal, round, and reactive to light.   Neck:      Thyroid: No thyromegaly.   Cardiovascular:      Rate and Rhythm: Normal rate and regular rhythm.      Heart sounds: No murmur heard.     Pulmonary:      Effort: Pulmonary effort is normal.      Breath sounds: Normal breath sounds.   Abdominal:      General: Abdomen is flat. Bowel sounds are normal. There is no distension.      Palpations: Abdomen is soft. There is no mass.   Musculoskeletal:         General: No swelling or tenderness. Normal range of motion.      Cervical back: Neck supple.   Skin:     General: Skin is warm and dry.      Capillary Refill: Capillary refill takes less than 2 seconds.   Neurological:      Mental Status: He is alert and oriented to person, place, and time.   Psychiatric:         Mood and Affect: Mood normal.         Behavior: Behavior normal.           Assessment/Plan   Diagnoses and all  orders for this visit:    1. Routine health maintenance (Primary)  -     TSH  -     Lipid Panel  -     CBC & Differential  -     Comprehensive Metabolic Panel    2. Adjustment disorder with anxiety    3. Cervical neuritis    4. Long-term use of high-risk medication    5. Vitamin B12 deficiency  -     Vitamin B12               Discussed the importance of maintaining a healthy weight and getting regular exercise.  Educated patient on the benefits of healthy diet.  Advise follow-up annually for wellness exams.    There are no Patient Instructions on file for this visit.

## 2021-04-28 LAB
ALBUMIN SERPL-MCNC: 4.4 G/DL (ref 3.8–4.9)
ALBUMIN/GLOB SERPL: 2.6 {RATIO} (ref 1.2–2.2)
ALP SERPL-CCNC: 78 IU/L (ref 39–117)
ALT SERPL-CCNC: 8 IU/L (ref 0–44)
AST SERPL-CCNC: 19 IU/L (ref 0–40)
BASOPHILS # BLD AUTO: 0 X10E3/UL (ref 0–0.2)
BASOPHILS NFR BLD AUTO: 1 %
BILIRUB SERPL-MCNC: <0.2 MG/DL (ref 0–1.2)
BUN SERPL-MCNC: 37 MG/DL (ref 6–24)
BUN/CREAT SERPL: 36 (ref 9–20)
CALCIUM SERPL-MCNC: 9.3 MG/DL (ref 8.7–10.2)
CHLORIDE SERPL-SCNC: 106 MMOL/L (ref 96–106)
CHOLEST SERPL-MCNC: 188 MG/DL (ref 100–199)
CO2 SERPL-SCNC: 23 MMOL/L (ref 20–29)
CREAT SERPL-MCNC: 1.04 MG/DL (ref 0.76–1.27)
EOSINOPHIL # BLD AUTO: 0.2 X10E3/UL (ref 0–0.4)
EOSINOPHIL NFR BLD AUTO: 3 %
ERYTHROCYTE [DISTWIDTH] IN BLOOD BY AUTOMATED COUNT: 12.5 % (ref 11.6–15.4)
GLOBULIN SER CALC-MCNC: 1.7 G/DL (ref 1.5–4.5)
GLUCOSE SERPL-MCNC: 84 MG/DL (ref 65–99)
HCT VFR BLD AUTO: 39.9 % (ref 37.5–51)
HDLC SERPL-MCNC: 60 MG/DL
HGB BLD-MCNC: 14 G/DL (ref 13–17.7)
IMM GRANULOCYTES # BLD AUTO: 0 X10E3/UL (ref 0–0.1)
IMM GRANULOCYTES NFR BLD AUTO: 1 %
LDLC SERPL CALC-MCNC: 103 MG/DL (ref 0–99)
LYMPHOCYTES # BLD AUTO: 1.5 X10E3/UL (ref 0.7–3.1)
LYMPHOCYTES NFR BLD AUTO: 26 %
MCH RBC QN AUTO: 34.3 PG (ref 26.6–33)
MCHC RBC AUTO-ENTMCNC: 35.1 G/DL (ref 31.5–35.7)
MCV RBC AUTO: 98 FL (ref 79–97)
MONOCYTES # BLD AUTO: 0.4 X10E3/UL (ref 0.1–0.9)
MONOCYTES NFR BLD AUTO: 6 %
NEUTROPHILS # BLD AUTO: 3.7 X10E3/UL (ref 1.4–7)
NEUTROPHILS NFR BLD AUTO: 63 %
PLATELET # BLD AUTO: 231 X10E3/UL (ref 150–450)
POTASSIUM SERPL-SCNC: 4.5 MMOL/L (ref 3.5–5.2)
PROT SERPL-MCNC: 6.1 G/DL (ref 6–8.5)
RBC # BLD AUTO: 4.08 X10E6/UL (ref 4.14–5.8)
SODIUM SERPL-SCNC: 142 MMOL/L (ref 134–144)
TRIGL SERPL-MCNC: 143 MG/DL (ref 0–149)
TSH SERPL DL<=0.005 MIU/L-ACNC: 1 UIU/ML (ref 0.45–4.5)
VIT B12 SERPL-MCNC: 578 PG/ML (ref 232–1245)
VLDLC SERPL CALC-MCNC: 25 MG/DL (ref 5–40)
WBC # BLD AUTO: 5.8 X10E3/UL (ref 3.4–10.8)

## 2021-05-05 DIAGNOSIS — F43.22 ADJUSTMENT DISORDER WITH ANXIETY: ICD-10-CM

## 2021-05-05 RX ORDER — ESCITALOPRAM OXALATE 10 MG/1
TABLET ORAL
Qty: 90 TABLET | Refills: 0 | Status: SHIPPED | OUTPATIENT
Start: 2021-05-05 | End: 2021-08-20

## 2021-07-02 ENCOUNTER — TELEPHONE (OUTPATIENT)
Dept: FAMILY MEDICINE CLINIC | Facility: CLINIC | Age: 56
End: 2021-07-02

## 2021-07-02 NOTE — TELEPHONE ENCOUNTER
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 THE PATIENT HANDED IN PAPERWORK AT LAST VISIT. THE PAPERWORK NEEDS TO BE MAILED, FAXED OR EMAILED. THE PATIENT IS REQUESTED TO BE NOTIFIED WHEN THIS HAS BEEN SENT IN.

## 2021-07-02 NOTE — TELEPHONE ENCOUNTER
Called pt and let him know it was scanned in chart and was sent. Advised pt if he needs to be sent again or if he wanted a copy to let us know. He was just making sure it was sent he said.

## 2021-07-29 ENCOUNTER — OFFICE VISIT (OUTPATIENT)
Dept: FAMILY MEDICINE CLINIC | Facility: CLINIC | Age: 56
End: 2021-07-29

## 2021-07-29 VITALS
BODY MASS INDEX: 24.65 KG/M2 | WEIGHT: 172.2 LBS | DIASTOLIC BLOOD PRESSURE: 72 MMHG | SYSTOLIC BLOOD PRESSURE: 142 MMHG | OXYGEN SATURATION: 98 % | TEMPERATURE: 98.2 F | HEIGHT: 70 IN | HEART RATE: 65 BPM

## 2021-07-29 DIAGNOSIS — M54.12 CERVICAL NEURITIS: Primary | ICD-10-CM

## 2021-07-29 DIAGNOSIS — Z79.899 LONG-TERM USE OF HIGH-RISK MEDICATION: ICD-10-CM

## 2021-07-29 DIAGNOSIS — M70.41 PREPATELLAR BURSITIS OF RIGHT KNEE: ICD-10-CM

## 2021-07-29 PROCEDURE — 99213 OFFICE O/P EST LOW 20 MIN: CPT | Performed by: FAMILY MEDICINE

## 2021-07-29 RX ORDER — GABAPENTIN 600 MG/1
600 TABLET ORAL 2 TIMES DAILY
Qty: 180 TABLET | Refills: 1 | Status: SHIPPED | OUTPATIENT
Start: 2021-07-29 | End: 2022-01-27

## 2021-07-29 NOTE — PROGRESS NOTES
"Chief Complaint  Lower Extremity Issue (right leg knee to ankle is swollen, since last friday)    Subjective          Jag Roche presents to [facility]  Right knee started to get fluid on it a few weeks ago.  Iced it and it got better.  Now swelling up again and it has gone into his ankle.  Swelling goes away overnight.    Needs a refill of his gabapentin.  It still helps him with his cervical neuritis.  Denies any diversion.  Takes it usually once a day.  Sometimes he needs a second dose.        Objective   Vital Signs:   /72   Pulse 65   Temp 98.2 °F (36.8 °C)   Ht 177.8 cm (70\")   Wt 78.1 kg (172 lb 3.2 oz)   SpO2 98%   BMI 24.71 kg/m²     Physical Exam  Constitutional:       General: He is not in acute distress.     Appearance: He is well-developed.   HENT:      Head: Normocephalic and atraumatic.   Eyes:      Conjunctiva/sclera: Conjunctivae normal.      Pupils: Pupils are equal, round, and reactive to light.   Pulmonary:      Effort: Pulmonary effort is normal. No respiratory distress.   Musculoskeletal:      Right lower leg: Edema present.      Comments: Right knee with significant prepatellar effusion that extends down to the tibial plateau.  No warmth or redness.  He does have 1+ edema in the right ankle.  Negative Homans' sign.  Bilateral calves measure 38 cm   Neurological:      Mental Status: He is alert and oriented to person, place, and time.   Psychiatric:         Mood and Affect: Mood normal.         Behavior: Behavior normal.        Result Review :                 Assessment and Plan    Diagnoses and all orders for this visit:    1. Cervical neuritis (Primary)  -     gabapentin (NEURONTIN) 600 MG tablet; Take 1 tablet by mouth 2 (Two) Times a Day.  Dispense: 180 tablet; Refill: 1    2. Long-term use of high-risk medication  -     Drug Profile [id number] - Urine, Clean Catch    3. Prepatellar bursitis of right knee  -     Ambulatory Referral to Orthopedic " Surgery    Cervical neuritis-gabapentin refilled, drug screen obtained  Prepatellar bursitis-I believe this is most likely what is causing the fluid to track down to his ankle.  I believe he has perhaps ruptured the prepatellar bursa since he is on his knees all the time for work.    Follow Up   No follow-ups on file.  Patient was given instructions and counseling regarding his condition or for health maintenance advice. Please see specific information pulled into the AVS if appropriate.

## 2021-07-29 NOTE — PATIENT INSTRUCTIONS
Try to avoid kneeling on your right knee is much as possible.  Can use compression with an Ace wrap or a brace and ice as needed.  Await consultation.

## 2021-08-20 DIAGNOSIS — F43.22 ADJUSTMENT DISORDER WITH ANXIETY: ICD-10-CM

## 2021-08-20 RX ORDER — ESCITALOPRAM OXALATE 10 MG/1
TABLET ORAL
Qty: 90 TABLET | Refills: 0 | Status: SHIPPED | OUTPATIENT
Start: 2021-08-20 | End: 2021-11-10 | Stop reason: SDUPTHER

## 2021-11-10 ENCOUNTER — OFFICE VISIT (OUTPATIENT)
Dept: FAMILY MEDICINE CLINIC | Facility: CLINIC | Age: 56
End: 2021-11-10

## 2021-11-10 VITALS
BODY MASS INDEX: 25.57 KG/M2 | DIASTOLIC BLOOD PRESSURE: 68 MMHG | SYSTOLIC BLOOD PRESSURE: 122 MMHG | TEMPERATURE: 97.3 F | OXYGEN SATURATION: 99 % | HEIGHT: 70 IN | WEIGHT: 178.6 LBS | HEART RATE: 63 BPM

## 2021-11-10 DIAGNOSIS — M54.31 SCIATICA OF RIGHT SIDE: ICD-10-CM

## 2021-11-10 DIAGNOSIS — R14.0 ABDOMINAL BLOATING: ICD-10-CM

## 2021-11-10 DIAGNOSIS — M54.12 CERVICAL NEURITIS: ICD-10-CM

## 2021-11-10 DIAGNOSIS — Z79.899 LONG-TERM USE OF HIGH-RISK MEDICATION: ICD-10-CM

## 2021-11-10 DIAGNOSIS — R19.7 DIARRHEA, UNSPECIFIED TYPE: ICD-10-CM

## 2021-11-10 DIAGNOSIS — E53.8 VITAMIN B12 DEFICIENCY: ICD-10-CM

## 2021-11-10 DIAGNOSIS — F43.22 ADJUSTMENT DISORDER WITH ANXIETY: Primary | ICD-10-CM

## 2021-11-10 PROCEDURE — 96372 THER/PROPH/DIAG INJ SC/IM: CPT | Performed by: FAMILY MEDICINE

## 2021-11-10 PROCEDURE — 99214 OFFICE O/P EST MOD 30 MIN: CPT | Performed by: FAMILY MEDICINE

## 2021-11-10 RX ORDER — METHYLPREDNISOLONE 4 MG/1
TABLET ORAL
Qty: 21 TABLET | Refills: 0 | Status: SHIPPED | OUTPATIENT
Start: 2021-11-10 | End: 2022-01-11

## 2021-11-10 RX ORDER — ESCITALOPRAM OXALATE 10 MG/1
10 TABLET ORAL DAILY
Qty: 90 TABLET | Refills: 1 | Status: SHIPPED | OUTPATIENT
Start: 2021-11-10 | End: 2022-05-12 | Stop reason: SDUPTHER

## 2021-11-10 RX ORDER — HYDROCODONE BITARTRATE AND ACETAMINOPHEN 10; 325 MG/1; MG/1
.5-1 TABLET ORAL EVERY 6 HOURS PRN
Qty: 15 TABLET | Refills: 0 | Status: SHIPPED | OUTPATIENT
Start: 2021-11-10 | End: 2022-01-11

## 2021-11-10 RX ORDER — KETOROLAC TROMETHAMINE 30 MG/ML
60 INJECTION, SOLUTION INTRAMUSCULAR; INTRAVENOUS ONCE
Status: COMPLETED | OUTPATIENT
Start: 2021-11-10 | End: 2021-11-10

## 2021-11-10 RX ORDER — TIZANIDINE 4 MG/1
4 TABLET ORAL EVERY 8 HOURS PRN
Qty: 30 TABLET | Refills: 0 | Status: SHIPPED | OUTPATIENT
Start: 2021-11-10 | End: 2022-05-12

## 2021-11-10 RX ADMIN — KETOROLAC TROMETHAMINE 60 MG: 30 INJECTION, SOLUTION INTRAMUSCULAR; INTRAVENOUS at 13:34

## 2021-11-10 NOTE — PROGRESS NOTES
"Chief Complaint  Back Pain and Med Refill    Subjective          Jag Roche presents to [facility]  Here for routine follow up.  Still taking gabapentin for his neck pain.  It still does well keeps him functional  The Escitalopram still helps for anxiety and feels his mood is doing well.  Has been having right sided sciatica over the past week.  He saw the chiropractor did not help.  Gets in to see pain management next week.  No foot drop.  Still having gassiness in his stomach.  Tried fiber and probiotics without relief.  Gets bloated all the time.        Objective   Vital Signs:   /68   Pulse 63   Temp 97.3 °F (36.3 °C)   Ht 177.8 cm (70\")   Wt 81 kg (178 lb 9.6 oz)   SpO2 99%   BMI 25.63 kg/m²     Physical Exam  Vitals and nursing note reviewed.   Constitutional:       General: He is not in acute distress.     Appearance: Normal appearance. He is well-developed.   HENT:      Head: Normocephalic and atraumatic.      Right Ear: External ear normal.      Left Ear: External ear normal.      Nose: Nose normal.      Mouth/Throat:      Mouth: Mucous membranes are moist.      Pharynx: Oropharynx is clear.   Eyes:      Conjunctiva/sclera: Conjunctivae normal.      Pupils: Pupils are equal, round, and reactive to light.   Neck:      Thyroid: No thyromegaly.   Cardiovascular:      Rate and Rhythm: Normal rate and regular rhythm.      Heart sounds: No murmur heard.      Pulmonary:      Effort: Pulmonary effort is normal.      Breath sounds: Normal breath sounds.   Abdominal:      General: Abdomen is flat. Bowel sounds are normal. There is no distension.      Palpations: Abdomen is soft. There is no mass.   Musculoskeletal:         General: Tenderness present. No swelling. Normal range of motion.      Cervical back: Neck supple.      Comments: Tender to palpation right lower lumbar paraspinous muscles.  Pain with range of motion   Skin:     General: Skin is warm and dry.      " Capillary Refill: Capillary refill takes less than 2 seconds.   Neurological:      Mental Status: He is alert and oriented to person, place, and time.      Sensory: No sensory deficit.      Motor: No weakness.      Coordination: Coordination normal.      Gait: Gait normal.      Deep Tendon Reflexes: Reflexes normal.   Psychiatric:         Mood and Affect: Mood normal.         Behavior: Behavior normal.        Result Review :{Labs  Result Review  Imaging  Med Tab  Media  Procedures :23}                 Assessment and Plan    Diagnoses and all orders for this visit:    1. Adjustment disorder with anxiety (Primary)    2. Cervical neuritis    3. Long-term use of high-risk medication    4. Vitamin B12 deficiency    5. Sciatica of right side    6. Abdominal bloating    7. Diarrhea, unspecified type    Adjustment disorder-stable, refill medication  Cervical neuritis-stable on gabapentin  B12 deficiency-check level today  Acute sciatica of the right side-okay with pain management referral, will give a few days of hydrocodone, will do a steroid burst and a muscle relaxer to see if it helps  Abdominal bloating and diarrhea-check labs today and try dairy free diet  Routine physical in 6 months    Follow Up   Return in about 6 months (around 5/10/2022) for Annual physical.  Patient was given instructions and counseling regarding his condition or for health maintenance advice. Please see specific information pulled into the AVS if appropriate.

## 2021-11-11 LAB
ALBUMIN SERPL-MCNC: 4.4 G/DL (ref 3.8–4.9)
ALBUMIN/GLOB SERPL: 2 {RATIO} (ref 1.2–2.2)
ALP SERPL-CCNC: 71 IU/L (ref 44–121)
ALT SERPL-CCNC: 8 IU/L (ref 0–44)
AMYLASE SERPL-CCNC: 81 U/L (ref 31–110)
AST SERPL-CCNC: 16 IU/L (ref 0–40)
BASOPHILS # BLD AUTO: 0 X10E3/UL (ref 0–0.2)
BASOPHILS NFR BLD AUTO: 1 %
BILIRUB SERPL-MCNC: 0.3 MG/DL (ref 0–1.2)
BUN SERPL-MCNC: 21 MG/DL (ref 6–24)
BUN/CREAT SERPL: 21 (ref 9–20)
CALCIUM SERPL-MCNC: 9.8 MG/DL (ref 8.7–10.2)
CHLORIDE SERPL-SCNC: 100 MMOL/L (ref 96–106)
CO2 SERPL-SCNC: 26 MMOL/L (ref 20–29)
CREAT SERPL-MCNC: 1 MG/DL (ref 0.76–1.27)
EOSINOPHIL # BLD AUTO: 0.2 X10E3/UL (ref 0–0.4)
EOSINOPHIL NFR BLD AUTO: 4 %
ERYTHROCYTE [DISTWIDTH] IN BLOOD BY AUTOMATED COUNT: 12.2 % (ref 11.6–15.4)
GLOBULIN SER CALC-MCNC: 2.2 G/DL (ref 1.5–4.5)
GLUCOSE SERPL-MCNC: 70 MG/DL (ref 65–99)
HCT VFR BLD AUTO: 41.9 % (ref 37.5–51)
HGB BLD-MCNC: 14.6 G/DL (ref 13–17.7)
IMM GRANULOCYTES # BLD AUTO: 0 X10E3/UL (ref 0–0.1)
IMM GRANULOCYTES NFR BLD AUTO: 0 %
LIPASE SERPL-CCNC: 76 U/L (ref 13–78)
LYMPHOCYTES # BLD AUTO: 1.6 X10E3/UL (ref 0.7–3.1)
LYMPHOCYTES NFR BLD AUTO: 35 %
MCH RBC QN AUTO: 34.1 PG (ref 26.6–33)
MCHC RBC AUTO-ENTMCNC: 34.8 G/DL (ref 31.5–35.7)
MCV RBC AUTO: 98 FL (ref 79–97)
MONOCYTES # BLD AUTO: 0.5 X10E3/UL (ref 0.1–0.9)
MONOCYTES NFR BLD AUTO: 11 %
NEUTROPHILS # BLD AUTO: 2.2 X10E3/UL (ref 1.4–7)
NEUTROPHILS NFR BLD AUTO: 49 %
PLATELET # BLD AUTO: 303 X10E3/UL (ref 150–450)
POTASSIUM SERPL-SCNC: 4.9 MMOL/L (ref 3.5–5.2)
PROT SERPL-MCNC: 6.6 G/DL (ref 6–8.5)
RBC # BLD AUTO: 4.28 X10E6/UL (ref 4.14–5.8)
SODIUM SERPL-SCNC: 139 MMOL/L (ref 134–144)
TSH SERPL DL<=0.005 MIU/L-ACNC: 1.95 UIU/ML (ref 0.45–4.5)
VIT B12 SERPL-MCNC: 777 PG/ML (ref 232–1245)
WBC # BLD AUTO: 4.5 X10E3/UL (ref 3.4–10.8)

## 2021-11-24 ENCOUNTER — TELEPHONE (OUTPATIENT)
Dept: FAMILY MEDICINE CLINIC | Facility: CLINIC | Age: 56
End: 2021-11-24

## 2021-11-24 DIAGNOSIS — M54.31 SCIATICA OF RIGHT SIDE: Primary | ICD-10-CM

## 2021-11-24 NOTE — TELEPHONE ENCOUNTER
Caller: Jag Roche A    Relationship: Self    Best call back number: [phone]    What is the medical concern/diagnosis: BACK PAIN    What specialty or service is being requested: NEUROSURGERY        Any additional details: PATIENT IS CALLING TO REQUEST A REFERRAL TO A NEUROSURGEON FOR BACK PAIN.  HE STATES THAT HE WORKS AT [employer] IF THERE IS A PROVIDER AT THAT LOCATION.    PLEASE ADVISE.

## 2021-11-29 NOTE — TELEPHONE ENCOUNTER
When I last saw him at the beginning of the month, he wanted to go see pain management for possible injections.  Did he change his mind?  I am not sure if the back specialist will even see him without an MRI first.

## 2021-12-10 ENCOUNTER — HOSPITAL ENCOUNTER (OUTPATIENT)
Dept: ULTRASOUND IMAGING | Facility: HOSPITAL | Age: 56
Discharge: HOME | End: 2021-12-10
Admitting: FAMILY MEDICINE

## 2021-12-10 DIAGNOSIS — R19.7 DIARRHEA, UNSPECIFIED TYPE: ICD-10-CM

## 2021-12-10 DIAGNOSIS — R14.0 ABDOMINAL BLOATING: ICD-10-CM

## 2021-12-10 PROCEDURE — 76705 ECHO EXAM OF ABDOMEN: CPT

## 2021-12-18 ENCOUNTER — HOSPITAL ENCOUNTER (OUTPATIENT)
Dept: MRI IMAGING | Facility: HOSPITAL | Age: 56
Discharge: HOME | End: 2021-12-18
Admitting: FAMILY MEDICINE

## 2021-12-18 DIAGNOSIS — M54.31 SCIATICA OF RIGHT SIDE: ICD-10-CM

## 2021-12-18 PROCEDURE — 72148 MRI LUMBAR SPINE W/O DYE: CPT

## 2021-12-19 DIAGNOSIS — R19.7 DIARRHEA, UNSPECIFIED TYPE: ICD-10-CM

## 2021-12-19 DIAGNOSIS — R14.0 ABDOMINAL BLOATING: Primary | ICD-10-CM

## 2021-12-27 DIAGNOSIS — M51.36 DDD (DEGENERATIVE DISC DISEASE), LUMBAR: ICD-10-CM

## 2021-12-27 DIAGNOSIS — M54.31 SCIATICA OF RIGHT SIDE: Primary | ICD-10-CM

## 2022-01-05 NOTE — PROGRESS NOTES
Subjective   History of Present Illness: Jag Roche is a 56 y.o. male is being seen for consultation today at the request of Meredith Lea Kehrer, MD for intermittent back pain that radiates into the left leg with numbness, tingling and weakness that worsened about 2 months ago.  This began spontaneously and affected his left buttock and left lateral thigh with a little bit of groin pain.  He said it was quite intense and he had to sleep in a recliner because he could not lay flat.  He denies any loss of bowel/bladder control. He reports a long history of back pain. He had a L-SALVADOR on 11-22-21 and 12-13-21 by Dr. Brizuela with moderate relief.  They have scheduled him for follow-up in about 3 months but he is not having pain at this time and thinks he will probably forego that.  He has also been to the chiropractor in the past.  He currently has no pain but wanted to have someone evaluate his MRI and establish a relationship should he need surgery in the future.    He had an episode of sciatica in the left leg 7 years ago got a single epidural steroid injection which resolved the symptoms completely until 2 months ago    While in the room and during my examination of the patient I wore a mask and eye protection.  I washed my hands before and after this patient encounter.  The patient was also wearing a mask.    Back Pain  The current episode started more than 1 year ago. The problem occurs intermittently. The problem has been gradually worsening since onset. The pain is present in the lumbar spine. The quality of the pain is described as aching, burning, cramping, shooting and stabbing. The pain radiates to the left knee, left foot and left thigh. The pain is moderate. The symptoms are aggravated by position. Associated symptoms include numbness, tingling and weakness. Pertinent negatives include no bladder incontinence, bowel incontinence or chest pain. He has tried chiropractic manipulation (L-SALVADOR x2) for the  "symptoms.       The following portions of the patient's history were reviewed and updated as appropriate: allergies, current medications, past family history, past medical history, past social history, past surgical history and problem list.    Review of Systems   Constitutional: Positive for activity change.   Respiratory: Negative for chest tightness and shortness of breath.    Cardiovascular: Negative for chest pain.   Gastrointestinal: Negative for bowel incontinence.   Genitourinary: Negative for bladder incontinence.   Musculoskeletal: Positive for back pain.   Neurological: Positive for tingling, weakness and numbness.   Psychiatric/Behavioral: Positive for sleep disturbance.       Objective     Vitals:    01/11/22 0925   BP: 160/84   Pulse: 60   Temp: 97.3 °F (36.3 °C)   SpO2: 95%   Weight: 80.7 kg (178 lb)   Height: 177.8 cm (70\")     Body mass index is 25.54 kg/m².      Physical Exam  Neurologic Exam    Physical Exam:    CONSTITUTIONAL: This 56year old   male appears well developed, well-nourished and in no acute distress.    HEAD & FACE: the head and face are symmetric, normocephalic and atraumatic.    EYES: Inspection of the conjunctivae and lids reveals no swelling, erythema or discharge.  Pupils are round, equal and reactive to light and there is no scleral icterus.    EARS, NOSE, MOUTH & THROAT: On inspection, the ears and nose are within normal limits.    NECK: the neck is supple and symmetric. The trachea is midline with no masses.    PULMONARY: Respiratory effort is normal with no increased work of breathing or signs of respiratory distress.    CARDIOVASCULAR: . Examination of the extremities shows no edema or varicosities.    LYMPHATIC: There is no palpable lymphadenopathy of the neck.    MUSCULOSKELETAL: Gait and station are within normal limits. The spine has normal alignment and range of motion.  No palpable abnormality of the lumbar spine.  He has no pain in the SI joints.    SKIN: " The skin is warm, dry and intact    NEUROLOGIC:   Cranial Nerves 2-12 intact  Normal motor strength noted. Muscle bulk and tone are normal.  Sensory exam is normal to fine touch to confrontational testing bilaterally  Reflexes on the right side demonstrates 2/4 Triceps Reflex, 2/4 Biceps Reflex, 2/4 Brachioradialis Reflex, 2/4 Knee Jerk Reflex and no ankle clonus on the right.   Reflexes on the left side demonstrates 2/4 Triceps Reflex, 2/4 Biceps Reflex, 2/4 Brachioradialis Reflex, 2/4 Knee Jerk Reflex and no ankle clonus on the left.  Superficial/Primitive Reflexes: primitive reflexes were absent.  Graves's, Babinski, and Clonus signs all negative.  No coordination deficit observed.  Radicular testing showed a negative Kirby (PIA) test and negative straight leg raise.  Cortical function is intact and without deficits. Speech is normal.    PSYCHIATRIC: oriented to person, place and time. Patient's mood and affect are normal.    Assessment/Plan   Independent Review of Radiographic Studies:      I personally reviewed the images from the following studies.    MRI of the lumbar spine without contrast done at [facility] on December 18, 2021 reveals significant discogenic change from L3-S1.  There is left neuroforaminal narrowing secondary to discogenic change at L3-4.  There is far lateral discogenic change to the right at L4-5 contributing to neuroforaminal stenosis at that level.  There is disc osteophyte complex at the entry of the neuroforamen at L5-S1 on the left which contacts but does not appear to compress the S1 nerve root in that location.    Medical Decision Making:      He did have an episode of sciatica that correlate with the L4 nerve root seen to the left at L3-4.  He has significant spondylosis degenerative change and neuroforaminal narrowing to the right at L4-5 as well as the potential for S1 nerve root irritation on the left.  He does not have symptoms to correlate with the right or the S1  nerve root.  Given his improvement with conservative management there is no role for upfront neurosurgical intervention.  I did recommend he consult with a physical therapist to go over some of the therapeutic exercises to help him recover if he gets exacerbations or to allow some prevention.  Should he develop recurrent sciatica we would be happy to reevaluate.  He is also going to maintain the relationship with pain management should he need an injection in the future.    No follow-ups on file.    Diagnoses and all orders for this visit:    1. Lumbar disc herniation with radiculopathy (Primary)  -     Ambulatory Referral to Physical Therapy Evaluate and treat    2. Degenerative lumbar disc  -     Ambulatory Referral to Physical Therapy Evaluate and treat             Laurent Peacock MD FACS FAANS  Neurological Surgery

## 2022-01-11 ENCOUNTER — OFFICE VISIT (OUTPATIENT)
Dept: NEUROSURGERY | Facility: CLINIC | Age: 57
End: 2022-01-11

## 2022-01-11 VITALS
TEMPERATURE: 97.3 F | HEIGHT: 70 IN | BODY MASS INDEX: 25.48 KG/M2 | DIASTOLIC BLOOD PRESSURE: 84 MMHG | HEART RATE: 60 BPM | SYSTOLIC BLOOD PRESSURE: 160 MMHG | OXYGEN SATURATION: 95 % | WEIGHT: 178 LBS

## 2022-01-11 DIAGNOSIS — M51.16 LUMBAR DISC HERNIATION WITH RADICULOPATHY: Primary | ICD-10-CM

## 2022-01-11 DIAGNOSIS — M51.36 DEGENERATIVE LUMBAR DISC: ICD-10-CM

## 2022-01-11 PROCEDURE — 99204 OFFICE O/P NEW MOD 45 MIN: CPT | Performed by: NEUROLOGICAL SURGERY

## 2022-01-14 ENCOUNTER — PATIENT ROUNDING (BHMG ONLY) (OUTPATIENT)
Dept: NEUROSURGERY | Facility: CLINIC | Age: 57
End: 2022-01-14

## 2022-01-25 ENCOUNTER — APPOINTMENT (OUTPATIENT)
Dept: NUCLEAR MEDICINE | Facility: HOSPITAL | Age: 57
End: 2022-01-25

## 2022-01-26 DIAGNOSIS — M54.12 CERVICAL NEURITIS: ICD-10-CM

## 2022-01-27 RX ORDER — GABAPENTIN 600 MG/1
TABLET ORAL
Qty: 180 TABLET | Refills: 0 | Status: SHIPPED | OUTPATIENT
Start: 2022-01-27 | End: 2022-05-12 | Stop reason: SDUPTHER

## 2022-04-08 ENCOUNTER — HOSPITAL ENCOUNTER (OUTPATIENT)
Dept: NUCLEAR MEDICINE | Facility: HOSPITAL | Age: 57
Discharge: HOME | End: 2022-04-08

## 2022-04-08 DIAGNOSIS — R19.7 DIARRHEA, UNSPECIFIED TYPE: ICD-10-CM

## 2022-04-08 DIAGNOSIS — R14.0 ABDOMINAL BLOATING: ICD-10-CM

## 2022-04-08 PROCEDURE — 78226 HEPATOBILIARY SYSTEM IMAGING: CPT

## 2022-04-08 PROCEDURE — 0 TECHNETIUM TC 99M MEBROFENIN KIT: Performed by: FAMILY MEDICINE

## 2022-04-08 PROCEDURE — A9537 TC99M MEBROFENIN: HCPCS | Performed by: FAMILY MEDICINE

## 2022-04-08 RX ORDER — KIT FOR THE PREPARATION OF TECHNETIUM TC 99M MEBROFENIN 45 MG/10ML
1 INJECTION, POWDER, LYOPHILIZED, FOR SOLUTION INTRAVENOUS
Status: COMPLETED | OUTPATIENT
Start: 2022-04-08 | End: 2022-04-08

## 2022-04-08 RX ADMIN — MEBROFENIN 1 DOSE: 45 INJECTION, POWDER, LYOPHILIZED, FOR SOLUTION INTRAVENOUS at 07:35

## 2022-05-12 ENCOUNTER — OFFICE VISIT (OUTPATIENT)
Dept: FAMILY MEDICINE CLINIC | Facility: CLINIC | Age: 57
End: 2022-05-12

## 2022-05-12 VITALS
OXYGEN SATURATION: 98 % | TEMPERATURE: 97.8 F | DIASTOLIC BLOOD PRESSURE: 70 MMHG | SYSTOLIC BLOOD PRESSURE: 124 MMHG | BODY MASS INDEX: 23.85 KG/M2 | HEART RATE: 57 BPM | HEIGHT: 70 IN | WEIGHT: 166.6 LBS

## 2022-05-12 DIAGNOSIS — L57.0 ACTINIC KERATOSIS: ICD-10-CM

## 2022-05-12 DIAGNOSIS — R14.0 ABDOMINAL BLOATING: ICD-10-CM

## 2022-05-12 DIAGNOSIS — M54.12 CERVICAL NEURITIS: ICD-10-CM

## 2022-05-12 DIAGNOSIS — Z12.5 SCREENING FOR PROSTATE CANCER: ICD-10-CM

## 2022-05-12 DIAGNOSIS — E53.8 VITAMIN B12 DEFICIENCY: ICD-10-CM

## 2022-05-12 DIAGNOSIS — F43.22 ADJUSTMENT DISORDER WITH ANXIETY: ICD-10-CM

## 2022-05-12 DIAGNOSIS — R19.7 DIARRHEA, UNSPECIFIED TYPE: ICD-10-CM

## 2022-05-12 DIAGNOSIS — Z79.899 LONG-TERM USE OF HIGH-RISK MEDICATION: ICD-10-CM

## 2022-05-12 DIAGNOSIS — Z00.00 ROUTINE HEALTH MAINTENANCE: Primary | ICD-10-CM

## 2022-05-12 PROBLEM — M54.16 LUMBAR RADICULOPATHY: Status: ACTIVE | Noted: 2021-11-16

## 2022-05-12 PROCEDURE — 99396 PREV VISIT EST AGE 40-64: CPT | Performed by: FAMILY MEDICINE

## 2022-05-12 RX ORDER — ESCITALOPRAM OXALATE 10 MG/1
10 TABLET ORAL DAILY
Qty: 90 TABLET | Refills: 1 | Status: SHIPPED | OUTPATIENT
Start: 2022-05-12 | End: 2022-12-07

## 2022-05-12 RX ORDER — GABAPENTIN 600 MG/1
600 TABLET ORAL 2 TIMES DAILY
Qty: 180 TABLET | Refills: 0 | Status: SHIPPED | OUTPATIENT
Start: 2022-05-12 | End: 2023-01-09

## 2022-05-12 NOTE — PROGRESS NOTES
Subjective   Jag Roche is a 56 y.o. male who presents for annual male wellness exam.  Chief Complaint   Patient presents with   • Annual Exam     POSSIBLE REFERRAL TO GASTRO    • Anxiety     Has a couple spots on the side of his face to look at.  Get sore at times.   Doing well with his gabapentin for his neck.  Saw Dr. GAMINO for his back in the fall.  Anxiety is still well controlled with Lexapro.  Denies any significant mood problems.  Has no other concerns today.  Needs health screening paperwork for work.    Sexual History: no ed   Contraception: na  Diet: standard  Exercise: yes  Last dental exam: up to date  Eye exam: due    Colon Cancer Screenin [facility]  PSA: no symptoms      Immunization History   Administered Date(s) Administered   • COVID-19 (MODERNA) 1st, 2nd, 3rd Dose Only 2021, 2021   • Flu Vaccine Intradermal Quad 18-64YR 2016, 2017, 10/17/2018   • Flu Vaccine Split Quad 10/17/2018, 10/17/2018   • FluLaval/Fluarix/Fluzone >6 10/30/2020   • Hepatitis A 2018, 2018, 2019   • PPD Test 10/02/2013, 10/02/2013   • Tdap 2017, 2017   • flucelvax quad pfs =>4 YRS 10/22/2019       The following portions of the patient's history were reviewed and updated as appropriate: allergies, current medications, past family history, past medical history, past social history, past surgical history and problem list.    Past Medical History:   Diagnosis Date   • Acute bronchitis    • Acute sinusitis    • Acute upper respiratory infection    • Adjustment disorder with anxiety    • Allergic rhinitis    • Cervical neuritis    • Chest pain    • Dark stools    • Degenerative lumbar disc    • Diarrhea    • Dyspepsia    • Food sticks on swallowing    • H/O degenerative disc disease    • History of renal calculi    • Increased urinary frequency    • Long-term use of high-risk medication    • Lumbar radiculitis    • Nocturia    • Pain in joint of right shoulder    • Pain in  joint, hand    • Right shoulder pain    • Subconjunctival hemorrhage    • Swelling of hand joint, right    • Urinary frequency    • Vitamin B12 deficiency    • [place] teeth extracted        History reviewed. No pertinent surgical history.    Family History   Problem Relation Age of Onset   • Other Mother         brain stem tumor   • Hypertension Mother    • Diabetes Mother    • Lung cancer Father    • Cancer Other         urinary   • Colon cancer Other        Social History     Socioeconomic History   • Marital status:    Tobacco Use   • Smoking status: Never Smoker   • Smokeless tobacco: Never Used   Substance and Sexual Activity   • Alcohol use: Yes         Current Outpatient Medications:   •  escitalopram (LEXAPRO) 10 MG tablet, Take 1 tablet by mouth Daily., Disp: 90 tablet, Rfl: 1  •  gabapentin (NEURONTIN) 600 MG tablet, Take 1 tablet by mouth 2 (Two) Times a Day., Disp: 180 tablet, Rfl: 0  •  naproxen sodium (ALEVE) 220 MG tablet, Take 220 mg by mouth 2 (Two) Times a Day As Needed., Disp: , Rfl:   •  vitamin B-12 (CYANOCOBALAMIN) 1000 MCG tablet, Take 1,000 mcg by mouth Daily., Disp: , Rfl:     Review of Systems   Constitutional: Negative for chills, fatigue and fever.   HENT: Negative for congestion, ear pain, rhinorrhea and sore throat.    Eyes: Negative for pain and redness.   Respiratory: Negative for cough, chest tightness and wheezing.    Cardiovascular: Negative for chest pain and leg swelling.   Gastrointestinal: Positive for abdominal distention and diarrhea. Negative for abdominal pain, constipation, nausea and vomiting.   Endocrine: Negative for polydipsia and polyphagia.   Genitourinary: Negative for dysuria and hematuria.   Musculoskeletal: Positive for neck pain. Negative for arthralgias and myalgias.   Skin: Positive for rash. Negative for color change.   Allergic/Immunologic: Negative.    Neurological: Negative for dizziness, weakness, light-headedness and headaches.   Hematological:  Negative.    Psychiatric/Behavioral: Negative for confusion, dysphoric mood and sleep disturbance.       Objective   Vitals:    05/12/22 0915   BP: 124/70   Pulse: 57   Temp: 97.8 °F (36.6 °C)   SpO2: 98%     Body mass index is 23.9 kg/m².  Physical Exam  Vitals and nursing note reviewed.   Constitutional:       General: He is not in acute distress.     Appearance: Normal appearance. He is well-developed.   HENT:      Head: Normocephalic and atraumatic.      Right Ear: Tympanic membrane, ear canal and external ear normal.      Left Ear: Tympanic membrane, ear canal and external ear normal.      Nose: Nose normal.      Mouth/Throat:      Mouth: Mucous membranes are moist.      Pharynx: Oropharynx is clear.   Eyes:      Conjunctiva/sclera: Conjunctivae normal.      Pupils: Pupils are equal, round, and reactive to light.   Neck:      Thyroid: No thyromegaly.   Cardiovascular:      Rate and Rhythm: Normal rate and regular rhythm.      Heart sounds: No murmur heard.  Pulmonary:      Effort: Pulmonary effort is normal.      Breath sounds: Normal breath sounds.   Abdominal:      General: Abdomen is flat. Bowel sounds are normal. There is no distension.      Palpations: Abdomen is soft. There is no mass.   Musculoskeletal:         General: No swelling or tenderness. Normal range of motion.      Cervical back: Neck supple.   Skin:     General: Skin is warm and dry.      Capillary Refill: Capillary refill takes less than 2 seconds.      Findings: Lesion present.      Comments: Sun damage noted on face with possible actinic keratoses on left temple   Neurological:      Mental Status: He is alert and oriented to person, place, and time.   Psychiatric:         Mood and Affect: Mood normal.         Behavior: Behavior normal.           Assessment & Plan   Diagnoses and all orders for this visit:    1. Routine health maintenance (Primary)  -     Lipid Panel  -     Comprehensive Metabolic Panel  -     CBC & Differential    2.  Cervical neuritis  -     gabapentin (NEURONTIN) 600 MG tablet; Take 1 tablet by mouth 2 (Two) Times a Day.  Dispense: 180 tablet; Refill: 0    3. Adjustment disorder with anxiety  -     escitalopram (LEXAPRO) 10 MG tablet; Take 1 tablet by mouth Daily.  Dispense: 90 tablet; Refill: 1    4. Screening for prostate cancer  -     PSA Screen    5. Vitamin B12 deficiency  -     Vitamin B12    6. Long-term use of high-risk medication    7. Abdominal bloating  -     Ambulatory Referral to Gastroenterology    8. Diarrhea, unspecified type  -     Ambulatory Referral to Gastroenterology  -     TSH    9. Actinic keratosis               Discussed the importance of maintaining a healthy weight and getting regular exercise.  Educated patient on the benefits of healthy diet.  Advise follow-up annually for wellness exams.    Patient Instructions   Recommend follow up with dermatology.

## 2022-05-13 LAB
ALBUMIN SERPL-MCNC: 4.3 G/DL (ref 3.8–4.9)
ALBUMIN/GLOB SERPL: 2 {RATIO} (ref 1.2–2.2)
ALP SERPL-CCNC: 70 IU/L (ref 44–121)
ALT SERPL-CCNC: 7 IU/L (ref 0–44)
AST SERPL-CCNC: 21 IU/L (ref 0–40)
BASOPHILS # BLD AUTO: 0 X10E3/UL (ref 0–0.2)
BASOPHILS NFR BLD AUTO: 1 %
BILIRUB SERPL-MCNC: 0.4 MG/DL (ref 0–1.2)
BUN SERPL-MCNC: 24 MG/DL (ref 6–24)
BUN/CREAT SERPL: 24 (ref 9–20)
CALCIUM SERPL-MCNC: 9.5 MG/DL (ref 8.7–10.2)
CHLORIDE SERPL-SCNC: 100 MMOL/L (ref 96–106)
CHOLEST SERPL-MCNC: 191 MG/DL (ref 100–199)
CO2 SERPL-SCNC: 25 MMOL/L (ref 20–29)
CREAT SERPL-MCNC: 0.98 MG/DL (ref 0.76–1.27)
EGFRCR SERPLBLD CKD-EPI 2021: 90 ML/MIN/1.73
EOSINOPHIL # BLD AUTO: 0.2 X10E3/UL (ref 0–0.4)
EOSINOPHIL NFR BLD AUTO: 4 %
ERYTHROCYTE [DISTWIDTH] IN BLOOD BY AUTOMATED COUNT: 11.8 % (ref 11.6–15.4)
GLOBULIN SER CALC-MCNC: 2.1 G/DL (ref 1.5–4.5)
GLUCOSE SERPL-MCNC: 82 MG/DL (ref 65–99)
HCT VFR BLD AUTO: 42.4 % (ref 37.5–51)
HDLC SERPL-MCNC: 61 MG/DL
HGB BLD-MCNC: 14.6 G/DL (ref 13–17.7)
IMM GRANULOCYTES # BLD AUTO: 0 X10E3/UL (ref 0–0.1)
IMM GRANULOCYTES NFR BLD AUTO: 0 %
LDLC SERPL CALC-MCNC: 120 MG/DL (ref 0–99)
LYMPHOCYTES # BLD AUTO: 1.5 X10E3/UL (ref 0.7–3.1)
LYMPHOCYTES NFR BLD AUTO: 35 %
MCH RBC QN AUTO: 33.2 PG (ref 26.6–33)
MCHC RBC AUTO-ENTMCNC: 34.4 G/DL (ref 31.5–35.7)
MCV RBC AUTO: 96 FL (ref 79–97)
MONOCYTES # BLD AUTO: 0.3 X10E3/UL (ref 0.1–0.9)
MONOCYTES NFR BLD AUTO: 7 %
NEUTROPHILS # BLD AUTO: 2.2 X10E3/UL (ref 1.4–7)
NEUTROPHILS NFR BLD AUTO: 53 %
PLATELET # BLD AUTO: 279 X10E3/UL (ref 150–450)
POTASSIUM SERPL-SCNC: 4.6 MMOL/L (ref 3.5–5.2)
PROT SERPL-MCNC: 6.4 G/DL (ref 6–8.5)
PSA SERPL-MCNC: 0.3 NG/ML (ref 0–4)
RBC # BLD AUTO: 4.4 X10E6/UL (ref 4.14–5.8)
SODIUM SERPL-SCNC: 138 MMOL/L (ref 134–144)
TRIGL SERPL-MCNC: 55 MG/DL (ref 0–149)
TSH SERPL DL<=0.005 MIU/L-ACNC: 1.56 UIU/ML (ref 0.45–4.5)
VIT B12 SERPL-MCNC: 1529 PG/ML (ref 232–1245)
VLDLC SERPL CALC-MCNC: 10 MG/DL (ref 5–40)
WBC # BLD AUTO: 4.1 X10E3/UL (ref 3.4–10.8)

## 2022-06-01 ENCOUNTER — PREP FOR SURGERY (OUTPATIENT)
Dept: SURGERY | Facility: SURGERY CENTER | Age: 57
End: 2022-06-01

## 2022-06-01 ENCOUNTER — OFFICE VISIT (OUTPATIENT)
Dept: GASTROENTEROLOGY | Facility: CLINIC | Age: 57
End: 2022-06-01

## 2022-06-01 VITALS
OXYGEN SATURATION: 98 % | HEART RATE: 61 BPM | BODY MASS INDEX: 23.89 KG/M2 | SYSTOLIC BLOOD PRESSURE: 130 MMHG | DIASTOLIC BLOOD PRESSURE: 78 MMHG | HEIGHT: 70 IN | TEMPERATURE: 97.7 F | WEIGHT: 166.9 LBS

## 2022-06-01 DIAGNOSIS — Z12.11 ENCOUNTER FOR SCREENING FOR MALIGNANT NEOPLASM OF COLON: ICD-10-CM

## 2022-06-01 DIAGNOSIS — R15.2 FECAL URGENCY: ICD-10-CM

## 2022-06-01 DIAGNOSIS — R14.0 ABDOMINAL BLOATING: Primary | ICD-10-CM

## 2022-06-01 DIAGNOSIS — K58.0 IRRITABLE BOWEL SYNDROME WITH DIARRHEA: ICD-10-CM

## 2022-06-01 PROCEDURE — 99204 OFFICE O/P NEW MOD 45 MIN: CPT | Performed by: INTERNAL MEDICINE

## 2022-06-01 RX ORDER — SODIUM CHLORIDE, SODIUM LACTATE, POTASSIUM CHLORIDE, CALCIUM CHLORIDE 600; 310; 30; 20 MG/100ML; MG/100ML; MG/100ML; MG/100ML
30 INJECTION, SOLUTION INTRAVENOUS CONTINUOUS PRN
Status: CANCELLED | OUTPATIENT
Start: 2022-06-01

## 2022-06-01 RX ORDER — SODIUM CHLORIDE 0.9 % (FLUSH) 0.9 %
10 SYRINGE (ML) INJECTION AS NEEDED
Status: CANCELLED | OUTPATIENT
Start: 2022-06-01

## 2022-06-01 RX ORDER — SODIUM CHLORIDE 0.9 % (FLUSH) 0.9 %
3 SYRINGE (ML) INJECTION EVERY 12 HOURS SCHEDULED
Status: CANCELLED | OUTPATIENT
Start: 2022-06-01

## 2022-06-01 NOTE — PATIENT INSTRUCTIONS
For Abdominal bloating:    complete course of Xifaxan 550 mg three times a day for 14 days  - Prescription sent to pharmacy     Schedule EGD and colonoscopy to further assess abdominal bloating and changing bowel habits, orders placed

## 2022-07-22 ENCOUNTER — OUTSIDE FACILITY SERVICE (OUTPATIENT)
Dept: GASTROENTEROLOGY | Facility: CLINIC | Age: 57
End: 2022-07-22

## 2022-07-22 PROCEDURE — 45380 COLONOSCOPY AND BIOPSY: CPT | Performed by: INTERNAL MEDICINE

## 2022-07-22 PROCEDURE — 43239 EGD BIOPSY SINGLE/MULTIPLE: CPT | Performed by: INTERNAL MEDICINE

## 2022-07-27 DIAGNOSIS — K20.90 ESOPHAGITIS: Primary | ICD-10-CM

## 2022-07-27 DIAGNOSIS — K29.70 GASTRITIS, PRESENCE OF BLEEDING UNSPECIFIED, UNSPECIFIED CHRONICITY, UNSPECIFIED GASTRITIS TYPE: ICD-10-CM

## 2022-07-27 RX ORDER — PANTOPRAZOLE SODIUM 40 MG/1
40 TABLET, DELAYED RELEASE ORAL DAILY
Qty: 90 TABLET | Refills: 3 | Status: SHIPPED | OUTPATIENT
Start: 2022-07-27 | End: 2023-02-14

## 2022-12-07 DIAGNOSIS — F43.22 ADJUSTMENT DISORDER WITH ANXIETY: ICD-10-CM

## 2022-12-07 RX ORDER — ESCITALOPRAM OXALATE 10 MG/1
TABLET ORAL
Qty: 90 TABLET | Refills: 0 | Status: SHIPPED | OUTPATIENT
Start: 2022-12-07 | End: 2023-03-09

## 2023-01-09 DIAGNOSIS — M54.12 CERVICAL NEURITIS: ICD-10-CM

## 2023-01-09 RX ORDER — GABAPENTIN 600 MG/1
TABLET ORAL
Qty: 180 TABLET | Refills: 0 | Status: SHIPPED | OUTPATIENT
Start: 2023-01-09

## 2023-01-18 ENCOUNTER — OFFICE VISIT (OUTPATIENT)
Dept: FAMILY MEDICINE CLINIC | Facility: CLINIC | Age: 58
End: 2023-01-18
Payer: COMMERCIAL

## 2023-01-18 VITALS
HEIGHT: 70 IN | BODY MASS INDEX: 25.74 KG/M2 | WEIGHT: 179.8 LBS | SYSTOLIC BLOOD PRESSURE: 138 MMHG | HEART RATE: 68 BPM | OXYGEN SATURATION: 98 % | DIASTOLIC BLOOD PRESSURE: 74 MMHG | TEMPERATURE: 97.7 F

## 2023-01-18 DIAGNOSIS — M54.12 CERVICAL NEURITIS: ICD-10-CM

## 2023-01-18 DIAGNOSIS — Z79.899 LONG-TERM USE OF HIGH-RISK MEDICATION: ICD-10-CM

## 2023-01-18 DIAGNOSIS — M54.31 SCIATICA OF RIGHT SIDE: ICD-10-CM

## 2023-01-18 DIAGNOSIS — K58.0 IRRITABLE BOWEL SYNDROME WITH DIARRHEA: Primary | ICD-10-CM

## 2023-01-18 PROCEDURE — 99213 OFFICE O/P EST LOW 20 MIN: CPT | Performed by: FAMILY MEDICINE

## 2023-01-18 NOTE — PROGRESS NOTES
"Chief Complaint  Abdominal Pain (Gas and bloating  /Had gallbladder u/s and egd   ) and Diarrhea    Subjective        Jag Roche presents to [facility]  History of Present Illness    The patient presents today to follow up on his abdomen . He had an ultrasound and a HIDA scan last year. He had an EGD over the summer.    The patient states he was prescribed Xifaxan by the gastroenterologist, which did help, but lately his stools have been loose again. He adds his stomach feels full. The patient states he was told everything looked fine, but the only reason he was prescribed the antibiotic was for his back pain. He adds he had E. coli in the past. The patient states his daughter works for a gastroenterologist, and she asked him if he should be tested for Crohn's or ulcerative colitis. The patient states he has never weighed that much in his life, and he weighs 180 pounds. He adds he can be 2 to 3 days without having a bowel movement, and then he will have urgency. The patient denies any blood or black tarry stools. He states he is not miserable, but it is uncomfortable. The patient states he eats a lot of vegetables, and he has stopped eating spicy foods. He adds he do not eat as much as he used to. The patient states he took probiotics for several weeks, but it did not help.    The patient states his mother passed away in 08/2022.    Objective   Vital Signs:  /74   Pulse 68   Temp 97.7 °F (36.5 °C)   Ht 177.8 cm (70\")   Wt 81.6 kg (179 lb 12.8 oz)   SpO2 98%   BMI 25.80 kg/m²   Estimated body mass index is 25.8 kg/m² as calculated from the following:    Height as of this encounter: 177.8 cm (70\").    Weight as of this encounter: 81.6 kg (179 lb 12.8 oz).             Physical Exam  Constitutional:       General: He is not in acute distress.     Appearance: He is well-developed.   HENT:      Head: Normocephalic and atraumatic.      Right Ear: Tympanic membrane, ear canal " and external ear normal.      Left Ear: Tympanic membrane, ear canal and external ear normal.      Nose: Nose normal.      Mouth/Throat:      Mouth: Mucous membranes are moist.      Pharynx: Oropharynx is clear.   Eyes:      Conjunctiva/sclera: Conjunctivae normal.      Pupils: Pupils are equal, round, and reactive to light.   Cardiovascular:      Rate and Rhythm: Normal rate and regular rhythm.      Heart sounds: No murmur heard.  Pulmonary:      Effort: Pulmonary effort is normal. No respiratory distress.      Breath sounds: Normal breath sounds.   Musculoskeletal:      Cervical back: Neck supple.      Right lower leg: No edema.      Left lower leg: No edema.   Lymphadenopathy:      Cervical: No cervical adenopathy.   Neurological:      Mental Status: He is alert and oriented to person, place, and time.   Psychiatric:         Mood and Affect: Mood normal.         Behavior: Behavior normal.        Result Review :        NM HIDA SCAN WITHOUT PHARMACOLOGICAL INTERVENTION (04/08/2022 09:43)  US Gallbladder (12/10/2021 08:33)    SCANNED PATHOLOGY (07/22/2022)  SCANNED - LABS (07/26/2022)           Assessment and Plan   Diagnoses and all orders for this visit:    1. Irritable bowel syndrome with diarrhea (Primary)    2. Long-term use of high-risk medication    3. Sciatica of right side    4. Cervical neuritis    1.  Abdomen  -The patient will do another round of Xifaxan.  Cervical neuritis and sciatica- doing well on gabapentin, continue current medication    -The patient will follow up with physical in 5/2023           Follow Up   Return in about 4 months (around 5/18/2023) for Annual physical.  Patient was given instructions and counseling regarding his condition or for health maintenance advice. Please see specific information pulled into the AVS if appropriate.     Transcribed from ambient dictation for Meredith Lea Kehrer, MD by Kathia Martel.  01/18/23   15:01 EST    Patient or patient representative verbalized  consent to the visit recording.

## 2023-01-25 ENCOUNTER — TELEPHONE (OUTPATIENT)
Dept: FAMILY MEDICINE CLINIC | Facility: CLINIC | Age: 58
End: 2023-01-25
Payer: COMMERCIAL

## 2023-01-25 NOTE — TELEPHONE ENCOUNTER
Caller: Jag Roche    Relationship: Self    Best call back number: [phone]    What is the best time to reach you: ANY TIME    Who are you requesting to speak with (clinical staff, provider,  specific staff member): DR. KEHRER    What was the call regarding: PATIENT STATES HIS INSURANCE WON'T COVER HIS rifAXIMin (Xifaxan) 200 MG tablet PRESCRIPTION AND HE NEEDS AN ALTERNATIVE CALLED IN.    PLEASE ADVISE    Do you require a callback: YES

## 2023-02-14 ENCOUNTER — TELEPHONE (OUTPATIENT)
Dept: GASTROENTEROLOGY | Facility: CLINIC | Age: 58
End: 2023-02-14

## 2023-02-14 ENCOUNTER — OFFICE VISIT (OUTPATIENT)
Dept: GASTROENTEROLOGY | Facility: CLINIC | Age: 58
End: 2023-02-14
Payer: COMMERCIAL

## 2023-02-14 VITALS
HEIGHT: 70 IN | WEIGHT: 179.7 LBS | OXYGEN SATURATION: 97 % | HEART RATE: 69 BPM | TEMPERATURE: 97.3 F | BODY MASS INDEX: 25.73 KG/M2 | DIASTOLIC BLOOD PRESSURE: 70 MMHG | SYSTOLIC BLOOD PRESSURE: 130 MMHG

## 2023-02-14 DIAGNOSIS — K58.0 IRRITABLE BOWEL SYNDROME WITH DIARRHEA: Primary | ICD-10-CM

## 2023-02-14 DIAGNOSIS — R14.0 ABDOMINAL BLOATING: ICD-10-CM

## 2023-02-14 PROCEDURE — 99214 OFFICE O/P EST MOD 30 MIN: CPT

## 2023-02-14 NOTE — PATIENT INSTRUCTIONS
We recommend starting a daily fiber supplement such as FiberCon     These can be purchased over-the-counter, generic brand is fine.  Fiber supplements can take 12 to 72 hours to start working. Make sure to drink 6 to 12 ounces of water or noncarbonated beverage with fiber supplement.     Recommended starting with half of product listed daily dose for 7 days to help reduce risk of abdominal bloating/gas and allow your body to acclimate to fiber diet intake.  If you do not experience any of these adverse effects may increase to daily dose listed on product.      For Abdominal Bloating and Gas:    Recommend starting a daily probiotic.  Recommend Align or [employer] available over-the-counter.  Take 1 capsule daily with food.    For stool changes      1.We will prescribe you a course of Xifaxan for IBS-D. You will take 1 tab 3 x daily x 14 days.

## 2023-02-14 NOTE — PROGRESS NOTES
"Chief Complaint   Patient presents with   • Diarrhea           History of Present Illness    Patient is a 57 y.o. who presents to the office for follow up evaluation.  Last in office visit was on 6/1/2022 with Dr. Jensen for abdominal bloating. Patient has a significant past medical history of gastroenteritis with E. coli around June 2020 without hospitalization.    On 7/22/2022 patient underwent EGD and colonoscopy evaluation with Dr. Jensen.  EGD remarkable for a 1 cm hiatal hernia, LA grade B esophagitis, gastritis, otherwise unremarkable exam.  Biopsies were benign without evidence of H. pylori or celiac disease.    Recommended patient remain on PPI therapy for the 12 weeks with indefinite antireflux precautions.    Colonoscopy remarkable for nonbleeding internal hemorrhoids, next colonoscopy for colon cancer screening recommended in 10 years or July 2032.    He reports that after completing Xifaxan as prescribed in July 2022 his stools improved in consistency and frequency returned to baseline.  States that since completing 14-day cycle of Xifaxan his stools have gradually returned to a loose consistency with increased fecal urgency.    He denies upper GI symptoms including heartburn, nausea, vomiting, or dysphagia and denies taking acid reducing therapy at this time    Bowel habits are described as occurring 1-2 times per day with fecal urgency without visible melena or hematochezia.  Denies unintentional weight loss or episodes of fecal incontinence.    Previous failed treatments include probiotics and multiple over the counter therapies without relief in symptoms.         Result Review :       SCANNED - COLONOSCOPY (07/22/2022)  SCANNED - LABS (07/26/2022)    Office Visit with Jean-Pierre Jensen MD (06/01/2022)      Vital Signs:   /70   Pulse 69   Temp 97.3 °F (36.3 °C)   Ht 177.8 cm (70\")   Wt 81.5 kg (179 lb 11.2 oz)   SpO2 97%   BMI 25.78 kg/m²     Body mass index is 25.78 kg/m².   "   Physical Exam      Assessment and Plan    Diagnoses and all orders for this visit:    1. Irritable bowel syndrome with diarrhea (Primary)  -     riFAXIMin (Xifaxan) 550 MG tablet; Take 1 tablet by mouth Every 8 (Eight) Hours for 14 days.  Dispense: 42 tablet; Refill: 3    2. Abdominal bloating           Discussion:    Patient presents today for evaluation of refractory fecal urgency and loose stools similar to that at time of most recent July 2022 and office visit with Dr. Jensen.  Patient is agreeable to repeating 14-day Xifaxan regimen.  Will provide update on completion of antibiotic regimen with recommendations on subsequent cycles to be based on symptom response at that time.    Patient offered dicyclomine prescription for fecal urgency.  He declines at time of today's visit however reports will contact our office in the future if he decides to proceed.    For abdominal bloating and increased flatulence, recommend patient start daily probiotic and fiber supplementation with written instructions provided on recommended supplements.    Patient is agreeable to the outlined above treatment plan.  Verbalizes understanding and will contact office for any new or worsening concerns.  All questions answered and support provided.        Patient Instructions   We recommend starting a daily fiber supplement such as FiberCon     These can be purchased over-the-counter, generic brand is fine.  Fiber supplements can take 12 to 72 hours to start working. Make sure to drink 6 to 12 ounces of water or noncarbonated beverage with fiber supplement.     Recommended starting with half of product listed daily dose for 7 days to help reduce risk of abdominal bloating/gas and allow your body to acclimate to fiber diet intake.  If you do not experience any of these adverse effects may increase to daily dose listed on product.      For Abdominal Bloating and Gas:    1. Recommend starting a daily probiotic.  Recommend Align or [employer] available over-the-counter.  Take 1 capsule daily with food.    For stool changes      1.We will prescribe you a course of Xifaxan for IBS-D. You will take 1 tab 3 x daily x 14 days.             EMR Dragon/Transcription Disclaimer:  This document has been Dictated utilizing Dragon dictation.

## 2023-02-14 NOTE — TELEPHONE ENCOUNTER
xifaxan denied, appeal available.    The denial was based on our criteria for Xifaxan Tab 550mg.   Per your health plan's criteria, this drug is covered if you meet the following:  (1) Your doctor tells us that this treatment is working for your condition as seen by both of the following:  Improvement in stomach (abdominal) pain and lower score on stool test (reduction in the [place] Stool  Scale).  (2) You have tried or cannot use both a tricyclic antidepressant (for example: amitriptyline) and Viberzi.  The information provided does not show that you meet the criteria listed above.  Thank you

## 2023-03-09 DIAGNOSIS — F43.22 ADJUSTMENT DISORDER WITH ANXIETY: ICD-10-CM

## 2023-03-09 RX ORDER — ESCITALOPRAM OXALATE 10 MG/1
TABLET ORAL
Qty: 90 TABLET | Refills: 0 | Status: SHIPPED | OUTPATIENT
Start: 2023-03-09

## 2023-04-14 DIAGNOSIS — M54.12 CERVICAL NEURITIS: ICD-10-CM

## 2023-04-14 RX ORDER — GABAPENTIN 600 MG/1
TABLET ORAL
Qty: 180 TABLET | Refills: 0 | Status: SHIPPED | OUTPATIENT
Start: 2023-04-14

## 2023-05-18 ENCOUNTER — TELEPHONE (OUTPATIENT)
Dept: FAMILY MEDICINE CLINIC | Facility: CLINIC | Age: 58
End: 2023-05-18

## 2023-05-18 NOTE — TELEPHONE ENCOUNTER
Caller: Jag Roche    Relationship to patient: Self    Best call back number: [phone]    Type of visit: PHYSICAL    Requested date: SOONER    If rescheduling, when is the original appointment: 09/07/23    Additional notes: PATIENT IS REQUESTING A SOONER APPOINTMENT IF POSSIBLE.            Acitretin Counseling:  I discussed with the patient the risks of acitretin including but not limited to hair loss, dry lips/skin/eyes, liver damage, hyperlipidemia, depression/suicidal ideation, photosensitivity.  Serious rare side effects can include but are not limited to pancreatitis, pseudotumor cerebri, bony changes, clot formation/stroke/heart attack.  Patient understands that alcohol is contraindicated since it can result in liver toxicity and significantly prolong the elimination of the drug by many years.

## 2023-06-08 ENCOUNTER — OFFICE VISIT (OUTPATIENT)
Dept: GASTROENTEROLOGY | Facility: CLINIC | Age: 58
End: 2023-06-08
Payer: COMMERCIAL

## 2023-06-08 VITALS
TEMPERATURE: 98.6 F | HEART RATE: 61 BPM | DIASTOLIC BLOOD PRESSURE: 72 MMHG | SYSTOLIC BLOOD PRESSURE: 128 MMHG | OXYGEN SATURATION: 97 % | BODY MASS INDEX: 24.18 KG/M2 | HEIGHT: 70 IN | WEIGHT: 168.9 LBS

## 2023-06-08 DIAGNOSIS — R14.0 ABDOMINAL BLOATING: Primary | ICD-10-CM

## 2023-06-08 DIAGNOSIS — R19.7 DIARRHEA, UNSPECIFIED TYPE: ICD-10-CM

## 2023-06-08 DIAGNOSIS — F43.22 ADJUSTMENT DISORDER WITH ANXIETY: ICD-10-CM

## 2023-06-08 RX ORDER — ESCITALOPRAM OXALATE 10 MG/1
TABLET ORAL
Qty: 90 TABLET | Refills: 0 | Status: SHIPPED | OUTPATIENT
Start: 2023-06-08

## 2023-06-08 NOTE — PROGRESS NOTES
Chief Complaint   Patient presents with    Diarrhea    Bloated           History of Present Illness    Patient is a 57 y.o. who presents to the office for follow up evaluation.  Last in office visit was on  2/14/23 . Patient has a significant past medical history of gastroenteritis with E. coli around June 2020 without hospitalization.     On 7/22/2022 patient underwent EGD and colonoscopy evaluation with Dr. Jensen.  EGD remarkable for a 1 cm hiatal hernia, LA grade B esophagitis, gastritis, otherwise unremarkable exam.  Biopsies were benign without evidence of H. pylori or celiac disease.     Recommended patient remain on PPI therapy for the 12 weeks with indefinite antireflux precautions.     Colonoscopy remarkable for nonbleeding internal hemorrhoids, next colonoscopy for colon cancer screening recommended in 10 years or July 2032.    Previous failed treatments include probiotics and multiple over the counter therapies without relief in symptoms.      Patient presents the office today for further evaluation of persistent abdominal bloating and stool changes.  Reports that despite repeating Xifaxan 14-day cycle recommended at prior visit his symptoms have not improved.    He denies upper GI concerns including heartburn, nausea, vomiting, or dysphagia.    Bowel habits are described as occurring 1-3 times a day predominantly in the morning however he will experience an unpredictable bowel movement with fecal urgency characterized by lower abdominal cramping in the afternoon without known trigger.  Denies visible melena or hematochezia.  However he does describe stool as loose in consistency with an oily appearance.    Denies unintentional weight loss.  States that he did stop taking fiber and probiotic supplementation secondary to no noticeable improvement in symptoms while taking.    Patient denies known family history of colon polyps, colon cancer, or IBD.  Distant family history of colon cancer       Result  "Review :       Office Visit with Chinyere Miller APRN (02/14/2023)       Vital Signs:   /72   Pulse 61   Temp 98.6 °F (37 °C)   Ht 177.8 cm (70\")   Wt 76.6 kg (168 lb 14.4 oz)   SpO2 97%   BMI 24.23 kg/m²     Body mass index is 24.23 kg/m².     Physical Exam  Vitals reviewed.   Constitutional:       General: He is not in acute distress.     Appearance: He is well-developed. He is not diaphoretic.   HENT:      Head: Normocephalic and atraumatic.   Eyes:      General: No scleral icterus.  Cardiovascular:      Rate and Rhythm: Normal rate and regular rhythm.   Pulmonary:      Effort: Pulmonary effort is normal.      Breath sounds: Normal breath sounds.   Abdominal:      General: Abdomen is flat. Bowel sounds are normal. There is no distension.      Palpations: Abdomen is soft. There is no mass.      Tenderness: There is no abdominal tenderness. There is no guarding or rebound.      Hernia: No hernia is present.   Skin:     General: Skin is warm and dry.   Neurological:      Mental Status: He is alert and oriented to person, place, and time.   Psychiatric:         Judgment: Judgment normal.         Assessment and Plan    Diagnoses and all orders for this visit:    1. Abdominal bloating (Primary)  -     Breath Hydrogen Test; Future    2. Diarrhea, unspecified type  -     Fecal Fat, Qualitative - Stool, Per Rectum  -     Pancreatic Elastase, Fecal - Stool, Per Rectum; Future  -     Gastrointestinal Panel, PCR - Stool, Per Rectum  -     Clostridioides difficile Toxin, PCR - Stool, Per Rectum           Discussion:    Patient presents today for follow-up with concerns about a recurrence of abdominal bloating and oily stool changes.  Recommend proceeding with hydrogen breath testing to assess for SIBO secondary to nonresponse to Xifaxan regimen despite initial improvement on first 14-day regimen in July 2022.    We will also proceed with stool testing to assess for an alternative infectious etiology to loose " stool as well as assess for exocrine pancreatic insufficiency by way of fecal elastase and fecal fat test.      If above outlined treatment plan is negative to consider CT imaging of the abdomen pelvis to further assess for alternative etiology to symptoms.    Patient is agreeable to the outlined above treatment plan.  Further recommendations to be made pending results of the above work-up and clinical course.Verbalizes understanding and will contact office for any new or worsening concerns.  All questions answered and support provided.        Patient Instructions   To Assess for Bacterial Cause for your Symptoms:    Schedule hydrogen breath test to assess for small intestinal bacterial overgrowth.  Nicolasa Buckner with [facility] will contact you to schedule     Stool Testing for Diarrhea:    We will check stool studies to assess for any infectious etiology that could be contributing to your symptoms.   Stool Specimens kit will be given today by the lab  If you cannot return stool specimens to lab within 24 hours, place specimen in provided bag with kit and keep in refrigerator until you are able to drop off specimen  Our office will contact you once we receive these results to discuss updating treatment plan as indicated   3.  I put in the orders for this, and you can go to the outpatient lab at any [facility] facility to  the stool kit for this. [facility], [facility], or our office building are all OK. Any  time Monday-Friday from 8-4 is OK for the          EMR Dragon/Transcription Disclaimer:  This document has been Dictated utilizing Dragon dictation.

## 2023-06-08 NOTE — PATIENT INSTRUCTIONS
To Assess for Bacterial Cause for your Symptoms:    Schedule hydrogen breath test to assess for small intestinal bacterial overgrowth.  Nicolasa Buckner with [facility] will contact you to schedule     Stool Testing for Diarrhea:    We will check stool studies to assess for any infectious etiology that could be contributing to your symptoms.   Stool Specimens kit will be given today by the lab  If you cannot return stool specimens to lab within 24 hours, place specimen in provided bag with kit and keep in refrigerator until you are able to drop off specimen  Our office will contact you once we receive these results to discuss updating treatment plan as indicated   3.  I put in the orders for this, and you can go to the outpatient lab at any [facility] facility to  the stool kit for this. [facility], [place], operated by [facility], [facility], or our office building are all OK. Any  time Monday-Friday from 8-4 is OK for the

## 2023-06-30 LAB
ADV 40+41 DNA STL QL NAA+NON-PROBE: NOT DETECTED
ASTRO TYP 1-8 RNA STL QL NAA+NON-PROBE: NOT DETECTED
C CAYETANENSIS DNA STL QL NAA+NON-PROBE: NOT DETECTED
C COLI+JEJ+UPSA DNA STL QL NAA+NON-PROBE: NOT DETECTED
C DIF TOX TCDA+TCDB STL QL NAA+NON-PROBE: NOT DETECTED
C DIFF TOX GENS STL QL NAA+PROBE: NEGATIVE
CRYPTOSP DNA STL QL NAA+NON-PROBE: NOT DETECTED
E COLI O157 DNA STL QL NAA+NON-PROBE: NORMAL
E HISTOLYT DNA STL QL NAA+NON-PROBE: NOT DETECTED
EAEC PAA PLAS AGGR+AATA ST NAA+NON-PRB: NOT DETECTED
EC STX1+STX2 GENES STL QL NAA+NON-PROBE: NOT DETECTED
EPEC EAE GENE STL QL NAA+NON-PROBE: NOT DETECTED
ETEC LTA+ST1A+ST1B TOX ST NAA+NON-PROBE: NOT DETECTED
FA STL QL: NORMAL
G LAMBLIA DNA STL QL NAA+NON-PROBE: NOT DETECTED
Lab: NORMAL
NEUTRAL FAT STL QL: NORMAL
NOROVIRUS GI+II RNA STL QL NAA+NON-PROBE: NOT DETECTED
P SHIGELLOIDES DNA STL QL NAA+NON-PROBE: NOT DETECTED
RVA RNA STL QL NAA+NON-PROBE: NOT DETECTED
S ENT+BONG DNA STL QL NAA+NON-PROBE: NOT DETECTED
SAPO I+II+IV+V RNA STL QL NAA+NON-PROBE: NOT DETECTED
SHIGELLA SP+EIEC IPAH ST NAA+NON-PROBE: NOT DETECTED
V CHOL+PARA+VUL DNA STL QL NAA+NON-PROBE: NOT DETECTED
V CHOLERAE DNA STL QL NAA+NON-PROBE: NOT DETECTED
Y ENTEROCOL DNA STL QL NAA+NON-PROBE: NOT DETECTED

## 2023-09-07 ENCOUNTER — OFFICE VISIT (OUTPATIENT)
Dept: FAMILY MEDICINE CLINIC | Facility: CLINIC | Age: 58
End: 2023-09-07
Payer: COMMERCIAL

## 2023-09-07 VITALS
TEMPERATURE: 98.3 F | SYSTOLIC BLOOD PRESSURE: 112 MMHG | HEART RATE: 54 BPM | WEIGHT: 176.4 LBS | DIASTOLIC BLOOD PRESSURE: 68 MMHG | BODY MASS INDEX: 25.25 KG/M2 | OXYGEN SATURATION: 98 % | HEIGHT: 70 IN

## 2023-09-07 DIAGNOSIS — Z23 NEED FOR SHINGLES VACCINE: ICD-10-CM

## 2023-09-07 DIAGNOSIS — Z79.899 LONG-TERM USE OF HIGH-RISK MEDICATION: ICD-10-CM

## 2023-09-07 DIAGNOSIS — Z00.00 ROUTINE HEALTH MAINTENANCE: Primary | ICD-10-CM

## 2023-09-07 DIAGNOSIS — F43.22 ADJUSTMENT DISORDER WITH ANXIETY: ICD-10-CM

## 2023-09-07 DIAGNOSIS — Z12.5 SCREENING FOR PROSTATE CANCER: ICD-10-CM

## 2023-09-07 DIAGNOSIS — M54.12 CERVICAL NEURITIS: ICD-10-CM

## 2023-09-07 PROCEDURE — 90750 HZV VACC RECOMBINANT IM: CPT | Performed by: FAMILY MEDICINE

## 2023-09-07 PROCEDURE — 99396 PREV VISIT EST AGE 40-64: CPT | Performed by: FAMILY MEDICINE

## 2023-09-07 RX ORDER — GABAPENTIN 600 MG/1
600 TABLET ORAL 2 TIMES DAILY
Qty: 180 TABLET | Refills: 1 | Status: SHIPPED | OUTPATIENT
Start: 2023-09-07

## 2023-09-07 RX ORDER — ESCITALOPRAM OXALATE 10 MG/1
10 TABLET ORAL DAILY
Qty: 90 TABLET | Refills: 1 | Status: SHIPPED | OUTPATIENT
Start: 2023-09-07

## 2023-09-07 NOTE — PROGRESS NOTES
Subjective   Jag Roche is a 58 y.o. male who presents for annual male wellness exam.  Chief Complaint   Patient presents with    Annual Exam    Anxiety     Had his GI work up.  Does not feel like bowels are right yet.  Encourage patient to reach back out to GI.  He is doing well with his Lexapro for his anxiety.  Gabapentin is still helping with his cervical neuritis.  He denies any diversion or abuse.    Sexual History: with wife   Contraception: na  Diet: standard  Exercise: yes  Last dental exam: up to date  Eye exam: up to date    Colon Cancer Screenin2022  PSA: due      Immunization History   Administered Date(s) Administered    COVID-19 (MODERNA) 1st,2nd,3rd Dose Monovalent 2021, 2021    Flu Vaccine Intradermal Quad 18-64YR 2016, 2017, 10/17/2018    Flu Vaccine Split Quad 10/17/2018, 10/17/2018    Fluzone >6mos 10/30/2020    Hepatitis A 2018, 2018, 2019    Influenza MDCK Quadrivalent with Preserative 10/17/2018    PPD Test 10/02/2013, 10/02/2013    Shingrix 2023    Tdap 2017, 2017    flucelvax quad pfs =>4 YRS 10/22/2019       The following portions of the patient's history were reviewed and updated as appropriate: allergies, current medications, past family history, past medical history, past social history, past surgical history and problem list.    Past Medical History:   Diagnosis Date    Acute bronchitis     Acute sinusitis     Acute upper respiratory infection     Adjustment disorder with anxiety     Allergic rhinitis     Cervical neuritis     Chest pain     Dark stools     Degenerative lumbar disc     Diarrhea     Dyspepsia     Food sticks on swallowing     H/O degenerative disc disease     History of renal calculi     Increased urinary frequency     Long-term use of high-risk medication     Lumbar radiculitis     Nocturia     Pain in joint of right shoulder     Pain in joint, hand     Right shoulder pain     Subconjunctival  hemorrhage     Swelling of hand joint, right     Urinary frequency     Vitamin B12 deficiency     [place] teeth extracted        Past Surgical History:   Procedure Laterality Date    COLONOSCOPY      HERNIA REPAIR      UPPER GASTROINTESTINAL ENDOSCOPY         Family History   Problem Relation Age of Onset    Other Mother         brain stem tumor    Hypertension Mother     Diabetes Mother     Lung cancer Father     Cancer Other         urinary    Colon cancer Other     Colon polyps Neg Hx     Crohn's disease Neg Hx     Irritable bowel syndrome Neg Hx     Ulcerative colitis Neg Hx        Social History     Socioeconomic History    Marital status:    Tobacco Use    Smoking status: Never    Smokeless tobacco: Never   Vaping Use    Vaping Use: Never used   Substance and Sexual Activity    Alcohol use: Yes    Drug use: Never    Sexual activity: Defer         Current Outpatient Medications:     escitalopram (LEXAPRO) 10 MG tablet, Take 1 tablet by mouth Daily., Disp: 90 tablet, Rfl: 1    gabapentin (NEURONTIN) 600 MG tablet, Take 1 tablet by mouth 2 (Two) Times a Day., Disp: 180 tablet, Rfl: 1    Review of Systems    Objective   Vitals:    09/07/23 1453   BP: 112/68   Pulse: 54   Temp: 98.3 °F (36.8 °C)   SpO2: 98%     Body mass index is 25.31 kg/m².  Physical Exam  Vitals and nursing note reviewed.   Constitutional:       General: He is not in acute distress.     Appearance: Normal appearance. He is well-developed.   HENT:      Head: Normocephalic and atraumatic.      Right Ear: Tympanic membrane, ear canal and external ear normal.      Left Ear: Tympanic membrane, ear canal and external ear normal.      Nose: Nose normal.      Mouth/Throat:      Mouth: Mucous membranes are moist.      Pharynx: Oropharynx is clear.   Eyes:      Conjunctiva/sclera: Conjunctivae normal.      Pupils: Pupils are equal, round, and reactive to light.   Neck:      Thyroid: No thyromegaly.   Cardiovascular:      Rate and Rhythm: Normal rate  and regular rhythm.      Heart sounds: No murmur heard.  Pulmonary:      Effort: Pulmonary effort is normal.      Breath sounds: Normal breath sounds.   Abdominal:      General: Abdomen is flat. Bowel sounds are normal. There is no distension.      Palpations: Abdomen is soft. There is no mass.   Musculoskeletal:         General: No swelling or tenderness. Normal range of motion.      Cervical back: Neck supple.   Skin:     General: Skin is warm and dry.      Capillary Refill: Capillary refill takes less than 2 seconds.   Neurological:      Mental Status: He is alert and oriented to person, place, and time.   Psychiatric:         Mood and Affect: Mood normal.         Behavior: Behavior normal.         Assessment & Plan   Diagnoses and all orders for this visit:    1. Routine health maintenance (Primary)  -     CBC & Differential  -     Comprehensive Metabolic Panel  -     Lipid Panel  -     TSH    2. Cervical neuritis  -     gabapentin (NEURONTIN) 600 MG tablet; Take 1 tablet by mouth 2 (Two) Times a Day.  Dispense: 180 tablet; Refill: 1    3. Adjustment disorder with anxiety  -     escitalopram (LEXAPRO) 10 MG tablet; Take 1 tablet by mouth Daily.  Dispense: 90 tablet; Refill: 1    4. Long-term use of high-risk medication  -     Drug Profile 329635 - Urine, Clean Catch    5. Screening for prostate cancer  -     PSA Screen    6. Need for shingles vaccine  -     Shingrix Vaccine        Cervical neuritis-continue gabapentin, contract updated, UDS  Adjustment disorder-continue Lexapro       Discussed the importance of maintaining a healthy weight and getting regular exercise.  Educated patient on the benefits of healthy diet.  Advise follow-up annually for wellness exams.    There are no Patient Instructions on file for this visit.

## 2023-09-08 ENCOUNTER — TELEPHONE (OUTPATIENT)
Dept: GASTROENTEROLOGY | Facility: CLINIC | Age: 58
End: 2023-09-08
Payer: COMMERCIAL

## 2023-09-08 DIAGNOSIS — R14.0 ABDOMINAL BLOATING: Primary | ICD-10-CM

## 2023-09-08 LAB
ALBUMIN SERPL-MCNC: 4.4 G/DL (ref 3.8–4.9)
ALBUMIN/GLOB SERPL: 2 {RATIO} (ref 1.2–2.2)
ALP SERPL-CCNC: 81 IU/L (ref 44–121)
ALT SERPL-CCNC: 9 IU/L (ref 0–44)
AST SERPL-CCNC: 24 IU/L (ref 0–40)
BASOPHILS # BLD AUTO: 0 X10E3/UL (ref 0–0.2)
BASOPHILS NFR BLD AUTO: 1 %
BILIRUB SERPL-MCNC: 0.3 MG/DL (ref 0–1.2)
BUN SERPL-MCNC: 22 MG/DL (ref 6–24)
BUN/CREAT SERPL: 25 (ref 9–20)
CALCIUM SERPL-MCNC: 9.6 MG/DL (ref 8.7–10.2)
CHLORIDE SERPL-SCNC: 100 MMOL/L (ref 96–106)
CHOLEST SERPL-MCNC: 236 MG/DL (ref 100–199)
CO2 SERPL-SCNC: 26 MMOL/L (ref 20–29)
CREAT SERPL-MCNC: 0.89 MG/DL (ref 0.76–1.27)
EGFRCR SERPLBLD CKD-EPI 2021: 99 ML/MIN/1.73
EOSINOPHIL # BLD AUTO: 0.1 X10E3/UL (ref 0–0.4)
EOSINOPHIL NFR BLD AUTO: 3 %
ERYTHROCYTE [DISTWIDTH] IN BLOOD BY AUTOMATED COUNT: 12.3 % (ref 11.6–15.4)
GLOBULIN SER CALC-MCNC: 2.2 G/DL (ref 1.5–4.5)
GLUCOSE SERPL-MCNC: 78 MG/DL (ref 70–99)
HCT VFR BLD AUTO: 42.4 % (ref 37.5–51)
HDLC SERPL-MCNC: 57 MG/DL
HGB BLD-MCNC: 14.4 G/DL (ref 13–17.7)
IMM GRANULOCYTES # BLD AUTO: 0 X10E3/UL (ref 0–0.1)
IMM GRANULOCYTES NFR BLD AUTO: 0 %
LDLC SERPL CALC-MCNC: 144 MG/DL (ref 0–99)
LYMPHOCYTES # BLD AUTO: 1.4 X10E3/UL (ref 0.7–3.1)
LYMPHOCYTES NFR BLD AUTO: 27 %
MCH RBC QN AUTO: 33.6 PG (ref 26.6–33)
MCHC RBC AUTO-ENTMCNC: 34 G/DL (ref 31.5–35.7)
MCV RBC AUTO: 99 FL (ref 79–97)
MONOCYTES # BLD AUTO: 0.4 X10E3/UL (ref 0.1–0.9)
MONOCYTES NFR BLD AUTO: 9 %
NEUTROPHILS # BLD AUTO: 3.2 X10E3/UL (ref 1.4–7)
NEUTROPHILS NFR BLD AUTO: 60 %
PLATELET # BLD AUTO: 288 X10E3/UL (ref 150–450)
POTASSIUM SERPL-SCNC: 4.4 MMOL/L (ref 3.5–5.2)
PROT SERPL-MCNC: 6.6 G/DL (ref 6–8.5)
PSA SERPL-MCNC: 0.6 NG/ML (ref 0–4)
RBC # BLD AUTO: 4.28 X10E6/UL (ref 4.14–5.8)
SODIUM SERPL-SCNC: 141 MMOL/L (ref 134–144)
TRIGL SERPL-MCNC: 194 MG/DL (ref 0–149)
TSH SERPL DL<=0.005 MIU/L-ACNC: 2.09 UIU/ML (ref 0.45–4.5)
VLDLC SERPL CALC-MCNC: 35 MG/DL (ref 5–40)
WBC # BLD AUTO: 5.1 X10E3/UL (ref 3.4–10.8)

## 2023-09-08 RX ORDER — METRONIDAZOLE 250 MG/1
250 TABLET ORAL 3 TIMES DAILY
Qty: 30 TABLET | Refills: 0 | Status: SHIPPED | OUTPATIENT
Start: 2023-09-08 | End: 2023-09-18

## 2023-09-08 NOTE — TELEPHONE ENCOUNTER
Patient called stating he's having intestinal bacterial issues again, the same as his appointment with KP on 6/8/23. Would like to have a refill of Flagyl sent to his pharmacy.

## 2023-09-08 NOTE — TELEPHONE ENCOUNTER
Informed patient of 's instructions as well as reminding him of alcohol avoidance during and for two weeks after treatment.  He will send an update after treatment is over approximately fourteen days.

## 2023-09-12 LAB
AMPHETAMINES UR QL SCN: NEGATIVE NG/ML
BARBITURATES UR QL: NEGATIVE NG/ML
BENZODIAZ UR QL SCN: NEGATIVE NG/ML
BZE UR QL: NEGATIVE NG/ML
CANNABINOIDS UR QL CFM: POSITIVE
CREAT UR-MCNC: 88.7 MG/DL (ref 20–300)
ETHANOL UR-MCNC: NEGATIVE %
MEPERIDINE UR QL: NEGATIVE NG/ML
METHADONE UR QL: NEGATIVE NG/ML
OPIATES UR QL SCN: NEGATIVE NG/ML
OXYCODONE+OXYMORPHONE UR QL SCN: NEGATIVE NG/ML
PCP UR QL: NEGATIVE NG/ML
PROPOXYPH UR QL: NEGATIVE NG/ML
TRAMADOL UR QL SCN: NEGATIVE NG/ML

## 2023-09-21 ENCOUNTER — TELEPHONE (OUTPATIENT)
Dept: FAMILY MEDICINE CLINIC | Facility: CLINIC | Age: 58
End: 2023-09-21

## 2023-09-21 NOTE — TELEPHONE ENCOUNTER
Jag Roche  INFORMED THE FORM IS IN THE CHART AND HE MAY NOT BE ABLE TO SEE IT THRU MYCHART. IF HE CAN'T FIND IT HE WILL STOP BY FOR A COPY

## 2023-09-21 NOTE — TELEPHONE ENCOUNTER
PATIENT CALLED STATING THAT HE WOULD LIKE TO HAVE THE FORM THAT DR. KEHRER FILLED OUT DURING HIS PHYSICAL ON 09/07/23 UPLOADED TO [employer] SO THAT HE MAY ACCESS IT.    PLEASE ADVISE  [phone]

## 2024-03-07 ENCOUNTER — OFFICE VISIT (OUTPATIENT)
Dept: FAMILY MEDICINE CLINIC | Facility: CLINIC | Age: 59
End: 2024-03-07
Payer: COMMERCIAL

## 2024-03-07 VITALS
OXYGEN SATURATION: 93 % | HEART RATE: 57 BPM | DIASTOLIC BLOOD PRESSURE: 78 MMHG | BODY MASS INDEX: 25.31 KG/M2 | SYSTOLIC BLOOD PRESSURE: 134 MMHG | WEIGHT: 176.8 LBS | TEMPERATURE: 96.4 F | HEIGHT: 70 IN

## 2024-03-07 DIAGNOSIS — Z23 NEED FOR SHINGLES VACCINE: ICD-10-CM

## 2024-03-07 DIAGNOSIS — F43.22 ADJUSTMENT DISORDER WITH ANXIETY: ICD-10-CM

## 2024-03-07 DIAGNOSIS — Z79.899 LONG-TERM USE OF HIGH-RISK MEDICATION: ICD-10-CM

## 2024-03-07 DIAGNOSIS — K58.0 IRRITABLE BOWEL SYNDROME WITH DIARRHEA: ICD-10-CM

## 2024-03-07 DIAGNOSIS — M54.12 CERVICAL NEURITIS: Primary | ICD-10-CM

## 2024-03-07 PROCEDURE — 99214 OFFICE O/P EST MOD 30 MIN: CPT | Performed by: FAMILY MEDICINE

## 2024-03-07 PROCEDURE — 90750 HZV VACC RECOMBINANT IM: CPT | Performed by: FAMILY MEDICINE

## 2024-03-07 RX ORDER — ESCITALOPRAM OXALATE 10 MG/1
10 TABLET ORAL DAILY
Qty: 90 TABLET | Refills: 1 | Status: SHIPPED | OUTPATIENT
Start: 2024-03-07

## 2024-03-07 RX ORDER — GABAPENTIN 600 MG/1
600 TABLET ORAL 2 TIMES DAILY
Qty: 180 TABLET | Refills: 1 | Status: SHIPPED | OUTPATIENT
Start: 2024-03-07

## 2024-03-07 NOTE — PROGRESS NOTES
"Chief Complaint  Anxiety and medication follow up     Subjective        Jag Roche presents to [facility]  History of Present Illness  Patient presents to follow-up on his cervical neuritis and anxiety disorder.  He is been doing well compliant with his medication.  He denies any side effects.  He denies any abuse of the gabapentin.  He also needs his second shingles vaccine.  He has had some increased GI upset recently.  He was diagnosed with IBS with diarrhea and responded well to Xifaxan in the past.  It has been over a year since he took the medication.      Objective   Vital Signs:  /78   Pulse 57   Temp 96.4 °F (35.8 °C) (Temporal)   Ht 177.8 cm (70\")   Wt 80.2 kg (176 lb 12.8 oz)   SpO2 93%   BMI 25.37 kg/m²   Estimated body mass index is 25.37 kg/m² as calculated from the following:    Height as of this encounter: 177.8 cm (70\").    Weight as of this encounter: 80.2 kg (176 lb 12.8 oz).               Physical Exam  Constitutional:       General: He is not in acute distress.     Appearance: He is well-developed.   HENT:      Head: Normocephalic and atraumatic.   Eyes:      Conjunctiva/sclera: Conjunctivae normal.      Pupils: Pupils are equal, round, and reactive to light.   Pulmonary:      Effort: Pulmonary effort is normal. No respiratory distress.   Abdominal:      Comments: Slightly increased bowel sounds   Neurological:      Mental Status: He is alert and oriented to person, place, and time.   Psychiatric:         Mood and Affect: Mood normal.         Behavior: Behavior normal.        Result Review :            CONTROLLED SUBSTANCE AGREEMENT - SCAN - CSA FOR GABAPENTIN (09/07/2023)          Assessment and Plan     Diagnoses and all orders for this visit:    1. Cervical neuritis (Primary)  -     gabapentin (NEURONTIN) 600 MG tablet; Take 1 tablet by mouth 2 (Two) Times a Day.  Dispense: 180 tablet; Refill: 1    2. Adjustment disorder with anxiety  -     " escitalopram (LEXAPRO) 10 MG tablet; Take 1 tablet by mouth Daily.  Dispense: 90 tablet; Refill: 1    3. Long-term use of high-risk medication  -     Drug Profile [id number] - Urine, Clean Catch    4. Need for shingles vaccine  -     Shingrix Vaccine    5. Irritable bowel syndrome with diarrhea  -     rifAXIMin (Xifaxan) 200 MG tablet; Take 1 tablet by mouth 3 (Three) Times a Day for 14 days.  Dispense: 42 tablet; Refill: 0    Cervical neuritis-continue gabapentin  Adjustment disorder-continue Lexapro  High risk medication-Antoine reviewed, contract up-to-date, needs UDS  Irritable bowel syndrome with diarrhea-we will go ahead and do another round of Xifaxan         Follow Up     Return in about 6 months (around 9/7/2024) for Annual physical.  Patient was given instructions and counseling regarding his condition or for health maintenance advice. Please see specific information pulled into the AVS if appropriate.

## 2024-03-08 LAB
ACCEPTABLE CREAT UR QL: 130.6 MG/DL (ref 20–300)
AMPHETAMINES UR QL SCN: NEGATIVE NG/ML
BARBITURATES UR QL: NEGATIVE NG/ML
BENZODIAZ UR QL SCN: NEGATIVE NG/ML
BZE UR QL: NEGATIVE NG/ML
CANNABINOIDS UR QL SCN: NEGATIVE NG/ML
ETHANOL UR-MCNC: NEGATIVE %
MEPERIDINE UR QL: NEGATIVE NG/ML
METHADONE UR QL: NEGATIVE NG/ML
OPIATES UR QL SCN: NEGATIVE NG/ML
OXYCODONE+OXYMORPHONE UR QL SCN: NEGATIVE NG/ML
PCP UR QL: NEGATIVE NG/ML
PROPOXYPH UR QL: NEGATIVE NG/ML
TRAMADOL UR QL SCN: NEGATIVE NG/ML

## 2024-03-11 ENCOUNTER — TELEPHONE (OUTPATIENT)
Dept: FAMILY MEDICINE CLINIC | Facility: CLINIC | Age: 59
End: 2024-03-11
Payer: COMMERCIAL

## 2024-06-05 ENCOUNTER — OFFICE VISIT (OUTPATIENT)
Dept: GASTROENTEROLOGY | Facility: CLINIC | Age: 59
End: 2024-06-05
Payer: COMMERCIAL

## 2024-06-05 VITALS
HEIGHT: 70 IN | TEMPERATURE: 96.9 F | HEART RATE: 73 BPM | WEIGHT: 173.3 LBS | DIASTOLIC BLOOD PRESSURE: 70 MMHG | OXYGEN SATURATION: 96 % | SYSTOLIC BLOOD PRESSURE: 120 MMHG | BODY MASS INDEX: 24.81 KG/M2

## 2024-06-05 DIAGNOSIS — K90.9 STEATORRHEA: ICD-10-CM

## 2024-06-05 DIAGNOSIS — R19.4 CHANGE IN BOWEL HABITS: ICD-10-CM

## 2024-06-05 DIAGNOSIS — R14.0 ABDOMINAL BLOATING: Primary | ICD-10-CM

## 2024-06-05 PROCEDURE — 99214 OFFICE O/P EST MOD 30 MIN: CPT

## 2024-06-05 RX ORDER — COVID-19 ANTIGEN TEST
220 KIT MISCELLANEOUS DAILY PRN
COMMUNITY

## 2024-06-05 RX ORDER — METRONIDAZOLE 250 MG/1
250 TABLET ORAL 3 TIMES DAILY
Qty: 30 TABLET | Refills: 0 | Status: SHIPPED | OUTPATIENT
Start: 2024-06-05 | End: 2024-06-15

## 2024-06-05 NOTE — PROGRESS NOTES
Chief Complaint   Patient presents with    Bloated           History of Present Illness    Patient is a 58 y.o. who presents to the office for follow up evaluation.  Last in office visit was on  6/8/2023 . Patient has a significant past medical history of gastroenteritis with E. coli around June 2020 without hospitalization, SIBO-positive hydrogen breath testing on 6/27/2023 treated with Flagyl.     On 7/22/2022 patient underwent EGD and colonoscopy evaluation with Dr. Jensen.  EGD remarkable for a 1 cm hiatal hernia, LA grade B esophagitis, gastritis, otherwise unremarkable exam.  Biopsies were benign without evidence of H. pylori or celiac disease.     Recommended patient remain on PPI therapy for the 12 weeks with indefinite antireflux precautions.     Colonoscopy remarkable for nonbleeding internal hemorrhoids, next colonoscopy for colon cancer screening recommended in 10 years or July 2032.    He denies upper GI symptoms including heartburn, nausea, vomiting, or dysphagia.    Patient states that despite treatment with Flagyl at time of last in office visit he continues to experience alternating bowel habits and borborygmi with varying stool consistencies.      He did experience an initial improvement in symptoms however is unsure how long he had improvement before gradual return this is also often associated with diffuse abdominal bloating that is often worse on days in which he has not had a recent bowel movement.    Most recent bowel habits were described as an absence of bowel habits over the weekend followed by multiple episodes of diarrhea on the following days with associated urgency that is not always  postprandial in onset.  Denies presence of melena or hematochezia.  Denies unintentional weight loss.    He continues daily fiber supplementation.  During this time he has also witnessed multiple episodes of steatorrhea without known specific trigger.    Patient denies known family history of colon polyps,  "colon cancer, or IBD.       Result Review :       Office Visit with Chinyere Miller APRN (06/08/2023)   SCANNED - LABS (06/27/2023) - positive tx with flagyl   Clostridioides difficile Toxin, PCR - Stool, Per Rectum (06/28/2023 00:00)  Gastrointestinal Panel, PCR - Stool, Per Rectum (06/28/2023 00:00)  Fecal Fat, Qualitative - Stool, Per Rectum (06/28/2023 00:00)  Pancreatic Elastase, Fecal - Stool, Per Rectum (06/08/2023 14:13)    Vital Signs:   /70   Pulse 73   Temp 96.9 °F (36.1 °C)   Ht 177.8 cm (70\")   Wt 78.6 kg (173 lb 4.8 oz)   SpO2 96%   BMI 24.87 kg/m²     Body mass index is 24.87 kg/m².     Physical Exam  Vitals reviewed.   Constitutional:       General: He is not in acute distress.     Appearance: Normal appearance. He is not ill-appearing.   Eyes:      General: No scleral icterus.  Pulmonary:      Effort: Pulmonary effort is normal. No respiratory distress.   Abdominal:      General: Bowel sounds are normal. There is no distension.      Palpations: Abdomen is soft. Abdomen is not rigid. There is no mass or pulsatile mass.      Tenderness: There is no abdominal tenderness. There is no guarding or rebound.      Hernia: No hernia is present.   Skin:     Coloration: Skin is not jaundiced.   Neurological:      Mental Status: He is alert and oriented to person, place, and time.   Psychiatric:         Thought Content: Thought content normal.         Judgment: Judgment normal.           Assessment and Plan    Diagnoses and all orders for this visit:    1. Abdominal bloating (Primary)  -     CT Abdomen Pelvis With Contrast; Future  -     Fecal Fat, Qualitative - Stool, Per Rectum  -     Pancreatic Elastase, Fecal - Stool, Per Rectum; Future  -     metroNIDAZOLE (FLAGYL) 250 MG tablet; Take 1 tablet by mouth 3 (Three) Times a Day for 10 days.  Dispense: 30 tablet; Refill: 0    2. Change in bowel habits  -     CT Abdomen Pelvis With Contrast; Future  -     Fecal Fat, Qualitative - Stool, Per Rectum  - "     Pancreatic Elastase, Fecal - Stool, Per Rectum; Future    3. Steatorrhea  -     Fecal Fat, Qualitative - Stool, Per Rectum  -     Pancreatic Elastase, Fecal - Stool, Per Rectum; Future           Discussion:    Patient is a pleasant 58-year-old male who presents today for further evaluation of return of abdominal bloating and diarrhea with urgency.  Discussed recommendations to proceed with fecal fat and fecal elastase testing as pancreatic elastase was not completed at time of recent stool testing to check for EPI component to symptoms.    I have also encouraged him to start Flagyl 250 mg 3 times daily x 10 days for suspected overlapping symptoms of SIBO.  Specially in the setting of alternating bowel habits and abdominal bloating with positive hydrogen breath testing.    We will also proceed with CT imaging of the abdomen pelvis to assess for an alternative cause to frequent abdominal bloating such as evidence of chronic pancreatitis contributing to steatorrhea.    In formed patient of next colonoscopy for colon cancer screening recommended in 10 years or July 2032.  Further recommendations to be made pending results of the above work-up and clinical course.    Patient is agreeable to the outlined above treatment plan.  Verbalizes understanding and will contact office for any new or worsening concerns.  All questions answered and support provided.        Patient Instructions   Scheduling of your Ordered Diagnostic Tests - CT abdomen to further assess symptoms:    A team member from [facility] scheduling department will contact you to schedule your tests.    You can also contact them directly at [phone].    Stool testing to assess oily stool:     For the stool test Avoid bismuth, Metamucil®, castor oil, mineral oil, or ingestion of oily salad dressing   Stop using laxatives before and during the test  Stop eating foods high in fiber  Stop using oily rectal creams or suppositories  For one  week prior to collection of the specimen     Stool Testing for Diarrhea:    We will check stool studies to assess for any infectious etiology that could be contributing to your symptoms.   Stool Specimens kit will be given today by the lab  If you cannot return stool specimens to lab within 24 hours, place specimen in provided bag with kit and keep in refrigerator until you are able to drop off specimen  Our office will contact you once we receive these results to discuss updating treatment plan as indicated   3.  I put in the orders for this, and you can go to the outpatient lab at any [facility], [place], operated by [facility] facility to  the stool kit for this. [facility], [facility], or our office building are all OK. Any time Monday-Friday from 8-4 is OK for the     For Abdominal bloating:    Begin taking flagyl 250 mg three times daily x 10 days, then send me an update on symptom response     next colonoscopy for colon cancer screening recommended in 10 years or July 2032.        EMR Dragon/Transcription Disclaimer:  This document has been Dictated utilizing Dragon dictation.

## 2024-06-05 NOTE — PATIENT INSTRUCTIONS
Scheduling of your Ordered Diagnostic Tests - CT abdomen to further assess symptoms:    A team member from [facility] scheduling department will contact you to schedule your tests.    You can also contact them directly at [phone].    Stool testing to assess oily stool:     For the stool test Avoid bismuth, Metamucil®, castor oil, mineral oil, or ingestion of oily salad dressing   Stop using laxatives before and during the test  Stop eating foods high in fiber  Stop using oily rectal creams or suppositories  For one week prior to collection of the specimen     Stool Testing for Diarrhea:    We will check stool studies to assess for any infectious etiology that could be contributing to your symptoms.   Stool Specimens kit will be given today by the lab  If you cannot return stool specimens to lab within 24 hours, place specimen in provided bag with kit and keep in refrigerator until you are able to drop off specimen  Our office will contact you once we receive these results to discuss updating treatment plan as indicated   3.  I put in the orders for this, and you can go to the outpatient lab at any [facility] facility to  the stool kit for this. [facility], [facility], or our office building are all OK. Any time Monday-Friday from 8-4 is OK for the     For Abdominal bloating:    Begin taking flagyl 250 mg three times daily x 10 days, then send me an update on symptom response     next colonoscopy for colon cancer screening recommended in 10 years or July 2032.

## 2024-06-11 ENCOUNTER — LAB (OUTPATIENT)
Dept: LAB | Facility: HOSPITAL | Age: 59
End: 2024-06-11
Payer: COMMERCIAL

## 2024-06-11 DIAGNOSIS — R19.4 CHANGE IN BOWEL HABITS: ICD-10-CM

## 2024-06-11 DIAGNOSIS — R14.0 ABDOMINAL BLOATING: ICD-10-CM

## 2024-06-11 DIAGNOSIS — K90.9 STEATORRHEA: ICD-10-CM

## 2024-06-11 PROCEDURE — 82653 EL-1 FECAL QUANTITATIVE: CPT

## 2024-06-11 PROCEDURE — 82705 FATS/LIPIDS FECES QUAL: CPT

## 2024-06-15 LAB — ELASTASE PANC STL-MCNT: >800 UG ELAST./G

## 2024-06-27 ENCOUNTER — HOSPITAL ENCOUNTER (OUTPATIENT)
Dept: CT IMAGING | Facility: HOSPITAL | Age: 59
Discharge: HOME | End: 2024-06-27
Payer: COMMERCIAL

## 2024-06-27 DIAGNOSIS — R19.4 CHANGE IN BOWEL HABITS: ICD-10-CM

## 2024-06-27 DIAGNOSIS — R14.0 ABDOMINAL BLOATING: ICD-10-CM

## 2024-06-27 PROCEDURE — 25510000001 IOPAMIDOL 61 % SOLUTION

## 2024-06-27 PROCEDURE — 0 DIATRIZOATE MEGLUMINE & SODIUM PER 1 ML

## 2024-06-27 PROCEDURE — 74177 CT ABD & PELVIS W/CONTRAST: CPT

## 2024-06-27 RX ADMIN — DIATRIZOATE MEGLUMINE AND DIATRIZOATE SODIUM 30 ML: 660; 100 LIQUID ORAL; RECTAL at 15:23

## 2024-06-27 RX ADMIN — IOPAMIDOL 85 ML: 612 INJECTION, SOLUTION INTRAVENOUS at 15:23

## 2024-07-03 ENCOUNTER — TELEPHONE (OUTPATIENT)
Dept: GASTROENTEROLOGY | Facility: CLINIC | Age: 59
End: 2024-07-03

## 2024-07-03 NOTE — TELEPHONE ENCOUNTER
Please encourage him to send us an update in 2 weeks after starting fiber supplementation.  If symptoms persist will recommend resubmitting stool testing at that time for further evaluation of symptoms.    Thank you,    CHEO Casey

## 2024-07-03 NOTE — TELEPHONE ENCOUNTER
If patient's symptoms are not improved after use of metronidazole can consider resubmitting stool sample for testing.    Chinyere Miller, CHEO

## 2024-07-03 NOTE — TELEPHONE ENCOUNTER
Hub staff attempted to follow warm transfer process and was unsuccessful     Caller: VANESSA     Relationship to patient: [facility]    Best call back number: [phone]    Patient is needing: PT'S Fecal Fat, Qualitative WAS NOT RAN BY [facility] AND SAMPLE CAN NO LONGER BE USED. PLEASE ADVISE

## 2024-07-03 NOTE — TELEPHONE ENCOUNTER
RN spoke to patient regarding CT results and discussed possibly resubmitting stool sample. Pt reports he is still having symptoms, but is willing to try recommendations presented with CT results. EL

## 2024-07-11 ENCOUNTER — TELEPHONE (OUTPATIENT)
Dept: FAMILY MEDICINE CLINIC | Facility: CLINIC | Age: 59
End: 2024-07-11

## 2024-07-11 NOTE — TELEPHONE ENCOUNTER
Spoke with pt and he is having No symptoms at all   That was the only imaging that was 6/2024 done   Seen [facility] antonio

## 2024-07-11 NOTE — TELEPHONE ENCOUNTER
Caller: Jag Roche    Relationship: Self    Best call back number: [phone] (Mobile)     What is the medical concern/diagnosis: CT SCAN ON STOMACH AND LOWER ABDOMEN ORDERED TANISHA GRIDER BY THAT SHOWED 7MM KIDNEY STONE IN RIGHT KIDNEY THAT NEEDS TO BE REMOVED BEFORE IT BECOMES EMERGENT    What specialty or service is being requested: UROLOGY    What is the provider, practice or medical service name: UNKNOWN- PATIENT STATES HE HAS HISTORY OF KIDNEY STONES THAT ARE TOO LARGE TO PASS    What is the office location: UNKNOWN    What is the office phone number: UNKNOWN    Any additional details: PATIENT IS ASKING FOR THIS REFERRAL ASAP

## 2024-07-11 NOTE — TELEPHONE ENCOUNTER
This does not sound emergent then.  He also could just call the urology office and make an appointment.  He should not need a referral with private insurance.

## 2024-07-11 NOTE — TELEPHONE ENCOUNTER
Does he have symptoms from this?  It still in his kidney.  It is not in the ureter.  When was his last imaging?  Who was his urologist?  He may not need to even see them yet.

## 2024-08-23 ENCOUNTER — TELEPHONE (OUTPATIENT)
Dept: GASTROENTEROLOGY | Facility: CLINIC | Age: 59
End: 2024-08-23

## 2024-09-09 ENCOUNTER — OFFICE VISIT (OUTPATIENT)
Dept: FAMILY MEDICINE CLINIC | Facility: CLINIC | Age: 59
End: 2024-09-09
Payer: COMMERCIAL

## 2024-09-09 VITALS
TEMPERATURE: 98.7 F | DIASTOLIC BLOOD PRESSURE: 64 MMHG | HEIGHT: 70 IN | SYSTOLIC BLOOD PRESSURE: 110 MMHG | BODY MASS INDEX: 24.48 KG/M2 | HEART RATE: 79 BPM | RESPIRATION RATE: 14 BRPM | WEIGHT: 171 LBS | OXYGEN SATURATION: 99 %

## 2024-09-09 DIAGNOSIS — E78.49 OTHER HYPERLIPIDEMIA: ICD-10-CM

## 2024-09-09 DIAGNOSIS — Z79.899 LONG-TERM USE OF HIGH-RISK MEDICATION: ICD-10-CM

## 2024-09-09 DIAGNOSIS — M54.12 CERVICAL NEURITIS: ICD-10-CM

## 2024-09-09 DIAGNOSIS — Z00.00 ADULT GENERAL MEDICAL EXAMINATION: Primary | ICD-10-CM

## 2024-09-09 DIAGNOSIS — F43.22 ADJUSTMENT DISORDER WITH ANXIETY: ICD-10-CM

## 2024-09-09 DIAGNOSIS — R60.0 LOCALIZED EDEMA: ICD-10-CM

## 2024-09-09 DIAGNOSIS — Z12.5 PROSTATE CANCER SCREENING: ICD-10-CM

## 2024-09-09 PROCEDURE — 99396 PREV VISIT EST AGE 40-64: CPT | Performed by: FAMILY MEDICINE

## 2024-09-09 RX ORDER — ESCITALOPRAM OXALATE 10 MG/1
10 TABLET ORAL DAILY
Qty: 90 TABLET | Refills: 1 | Status: SHIPPED | OUTPATIENT
Start: 2024-09-09

## 2024-09-09 RX ORDER — GABAPENTIN 600 MG/1
600 TABLET ORAL 2 TIMES DAILY
Qty: 180 TABLET | Refills: 1 | Status: SHIPPED | OUTPATIENT
Start: 2024-09-09

## 2024-09-09 NOTE — PROGRESS NOTES
Subjective   Jag Roche is a 59 y.o. male who presents for annual male wellness exam.  Chief Complaint   Patient presents with    Annual Exam        History of Present Illness  The patient is a 59-year-old male who presents for an annual physical as well as follow-up on his cervical neuritis and adjustment disorder.    He reports that the itching sensation has migrated to his neck, but he is reluctant to increase the dosage of gabapentin. He also mentions regular swelling in his ankles and feet, which subsides overnight. He does not consume excessive salt in his diet.    His mood remains stable on Lexapro, with no thoughts of self-harm, harm to others, or feelings of hopelessness.    He consulted a gastroenterologist for his Irritable Bowel Syndrome (IBS) and was advised to start taking a stool softener. This treatment has significantly improved his condition, reducing the severity of bloating and gas.    He had kidney stones and underwent a procedure to break them down. He passed those stones after the procedure.       Sexual History: yes, with wife, no ED   Contraception: na  Diet: standard  Exercise: yes  Last dental exam: up to date  Eye exam: up to date    Colon Cancer Screenin2022  PSA: due      Immunization History   Administered Date(s) Administered    31-influenza Vac Quardvalent Preservativ 10/06/2023    COVID-19 (MODERNA) 1st,2nd,3rd Dose Monovalent 2021, 2021    Flu Vaccine Intradermal Quad 18-64YR 2016, 2017, 10/17/2018    Flu Vaccine Split Quad 10/17/2018, 10/17/2018    Fluzone (or Fluarix & Flulaval for VFC) >6mos 10/30/2020    Hepatitis A 2018, 2018, 2019    Influenza Injectable Mdck Pf Quad 10/06/2023    Influenza MDCK Quadrivalent with Preserative 10/17/2018    PPD Test 10/02/2013, 10/02/2013    Pneumococcal Conjugate 20-Valent (PCV20) 10/06/2023    Shingrix 2023, 2024    Tdap 2017, 2017    flucelvax quad pfs =>4 YRS  10/22/2019       The following portions of the patient's history were reviewed and updated as appropriate: allergies, current medications, past family history, past medical history, past social history, past surgical history and problem list.    Past Medical History:   Diagnosis Date    Acute bronchitis     Acute sinusitis     Acute upper respiratory infection     Adjustment disorder with anxiety     Allergic rhinitis     Cervical neuritis     Chest pain     Dark stools     Degenerative lumbar disc     Diarrhea     Dyspepsia     Food sticks on swallowing     H/O degenerative disc disease     History of renal calculi     Increased urinary frequency     Long-term use of high-risk medication     Lumbar radiculitis     Nocturia     Pain in joint of right shoulder     Pain in joint, hand     Right shoulder pain     Subconjunctival hemorrhage     Swelling of hand joint, right     Urinary frequency     Vitamin B12 deficiency     [place] teeth extracted        Past Surgical History:   Procedure Laterality Date    COLONOSCOPY      HERNIA REPAIR      KNEE SURGERY      UPPER GASTROINTESTINAL ENDOSCOPY         Family History   Problem Relation Age of Onset    Other Mother         brain stem tumor    Hypertension Mother     Diabetes Mother     Lung cancer Father     Cancer Other         urinary    Colon cancer Other     Colon polyps Neg Hx     Crohn's disease Neg Hx     Irritable bowel syndrome Neg Hx     Ulcerative colitis Neg Hx        Social History     Socioeconomic History    Marital status:    Tobacco Use    Smoking status: Never    Smokeless tobacco: Never   Vaping Use    Vaping status: Never Used   Substance and Sexual Activity    Alcohol use: Yes    Drug use: Never    Sexual activity: Defer         Current Outpatient Medications:     escitalopram (LEXAPRO) 10 MG tablet, Take 1 tablet by mouth Daily., Disp: 90 tablet, Rfl: 1    gabapentin (NEURONTIN) 600 MG tablet, Take 1 tablet by mouth 2 (Two) Times a Day., Disp:  180 tablet, Rfl: 1    Naproxen Sodium (Aleve) 220 MG capsule, Take 220 mg by mouth Daily As Needed., Disp: , Rfl:     Review of Systems    Objective   Vitals:    09/09/24 1429   BP: 110/64   Pulse: 79   Resp: 14   Temp: 98.7 °F (37.1 °C)   SpO2: 99%     Body mass index is 24.54 kg/m².  Physical Exam  Vitals and nursing note reviewed.   Constitutional:       General: He is not in acute distress.     Appearance: Normal appearance. He is well-developed.   HENT:      Head: Normocephalic and atraumatic.      Right Ear: Tympanic membrane, ear canal and external ear normal.      Left Ear: Tympanic membrane, ear canal and external ear normal.      Nose: Nose normal.      Mouth/Throat:      Mouth: Mucous membranes are moist.      Pharynx: Oropharynx is clear.   Eyes:      Conjunctiva/sclera: Conjunctivae normal.      Pupils: Pupils are equal, round, and reactive to light.   Neck:      Thyroid: No thyromegaly.   Cardiovascular:      Rate and Rhythm: Normal rate and regular rhythm.      Heart sounds: No murmur heard.  Pulmonary:      Effort: Pulmonary effort is normal.      Breath sounds: Normal breath sounds.   Abdominal:      General: Abdomen is flat. Bowel sounds are normal. There is no distension.      Palpations: Abdomen is soft. There is no mass.   Musculoskeletal:         General: No swelling or tenderness. Normal range of motion.      Cervical back: Neck supple.   Skin:     General: Skin is warm and dry.      Capillary Refill: Capillary refill takes less than 2 seconds.   Neurological:      Mental Status: He is alert and oriented to person, place, and time.   Psychiatric:         Mood and Affect: Mood normal.         Behavior: Behavior normal.       Physical Exam         Results         Assessment & Plan   Diagnoses and all orders for this visit:    1. Adult general medical examination (Primary)  -     TSH  -     Lipid Panel  -     CBC & Differential  -     Comprehensive Metabolic Panel    2. Cervical neuritis  -      gabapentin (NEURONTIN) 600 MG tablet; Take 1 tablet by mouth 2 (Two) Times a Day.  Dispense: 180 tablet; Refill: 1    3. Adjustment disorder with anxiety  -     escitalopram (LEXAPRO) 10 MG tablet; Take 1 tablet by mouth Daily.  Dispense: 90 tablet; Refill: 1    4. Long-term use of high-risk medication  -     TSH  -     Lipid Panel  -     CBC & Differential  -     Comprehensive Metabolic Panel  -     Drug Profile [id number] - Urine, Clean Catch    5. Other hyperlipidemia  -     TSH  -     Lipid Panel  -     CBC & Differential  -     Comprehensive Metabolic Panel    6. Localized edema  -     TSH  -     Lipid Panel  -     CBC & Differential  -     Comprehensive Metabolic Panel    7. Prostate cancer screening  -     PSA Screen      Assessment & Plan  1. Cervical Neuritis.  He continues to take gabapentin for his neck symptoms but prefers not to increase the dosage. He reports that his symptoms are manageable with the current dose. Swelling in his ankles and feet is noted, which is a common side effect of gabapentin. Thyroid function, blood count, and kidney function tests will be conducted to rule out other potential causes of the swelling.    2. Adjustment Disorder.  He reports that his mood is stable on Lexapro and does not have thoughts of harming himself or others. Continued monitoring and supportive therapy are recommended. He should continue taking Lexapro as prescribed.    3. Irritable Bowel Syndrome (IBS).  He reports improvement in his symptoms with the use of stool softeners, although he still experiences some gas and bloating. He should continue with the stool softeners and dietary modifications as advised by the gastroenterologist.    4. Kidney Stones.  He recently passed kidney stones after a procedure. No new treatment is required at this time, but monitoring for recurrence is advised.    5. Medication Management.  He inquired about routine testosterone testing, which is not covered by insurance due to the  lack of risk factors. Routine PSA testing was done last summer and is due to be repeated at this time.              Discussed the importance of maintaining a healthy weight and getting regular exercise.  Educated patient on the benefits of healthy diet.  Advise follow-up annually for wellness exams.    There are no Patient Instructions on file for this visit.    Patient or patient representative verbalized consent for the use of Ambient Listening during the visit with  Meredith Lea Kehrer, MD for chart documentation. 9/9/2024  15:14 EDT

## 2024-09-10 LAB
ALBUMIN SERPL-MCNC: 4.2 G/DL (ref 3.8–4.9)
ALP SERPL-CCNC: 101 IU/L (ref 44–121)
ALT SERPL-CCNC: 9 IU/L (ref 0–44)
AST SERPL-CCNC: 19 IU/L (ref 0–40)
BASOPHILS # BLD AUTO: 0 X10E3/UL (ref 0–0.2)
BASOPHILS NFR BLD AUTO: 1 %
BILIRUB SERPL-MCNC: 0.3 MG/DL (ref 0–1.2)
BUN SERPL-MCNC: 26 MG/DL (ref 8–27)
BUN/CREAT SERPL: 27 (ref 10–24)
CALCIUM SERPL-MCNC: 9.4 MG/DL (ref 8.6–10.2)
CHLORIDE SERPL-SCNC: 102 MMOL/L (ref 96–106)
CHOLEST SERPL-MCNC: 200 MG/DL (ref 100–199)
CO2 SERPL-SCNC: 25 MMOL/L (ref 20–29)
CREAT SERPL-MCNC: 0.95 MG/DL (ref 0.76–1.27)
EGFRCR SERPLBLD CKD-EPI 2021: 92 ML/MIN/1.73
EOSINOPHIL # BLD AUTO: 0.2 X10E3/UL (ref 0–0.4)
EOSINOPHIL NFR BLD AUTO: 5 %
ERYTHROCYTE [DISTWIDTH] IN BLOOD BY AUTOMATED COUNT: 12.4 % (ref 11.6–15.4)
GLOBULIN SER CALC-MCNC: 2.2 G/DL (ref 1.5–4.5)
GLUCOSE SERPL-MCNC: 74 MG/DL (ref 70–99)
HCT VFR BLD AUTO: 40.8 % (ref 37.5–51)
HDLC SERPL-MCNC: 56 MG/DL
HGB BLD-MCNC: 13.5 G/DL (ref 13–17.7)
IMM GRANULOCYTES # BLD AUTO: 0 X10E3/UL (ref 0–0.1)
IMM GRANULOCYTES NFR BLD AUTO: 0 %
LDLC SERPL CALC-MCNC: 121 MG/DL (ref 0–99)
LYMPHOCYTES # BLD AUTO: 1.5 X10E3/UL (ref 0.7–3.1)
LYMPHOCYTES NFR BLD AUTO: 33 %
MCH RBC QN AUTO: 33.6 PG (ref 26.6–33)
MCHC RBC AUTO-ENTMCNC: 33.1 G/DL (ref 31.5–35.7)
MCV RBC AUTO: 102 FL (ref 79–97)
MONOCYTES # BLD AUTO: 0.4 X10E3/UL (ref 0.1–0.9)
MONOCYTES NFR BLD AUTO: 9 %
NEUTROPHILS # BLD AUTO: 2.3 X10E3/UL (ref 1.4–7)
NEUTROPHILS NFR BLD AUTO: 52 %
PLATELET # BLD AUTO: 279 X10E3/UL (ref 150–450)
POTASSIUM SERPL-SCNC: 4.2 MMOL/L (ref 3.5–5.2)
PROT SERPL-MCNC: 6.4 G/DL (ref 6–8.5)
PSA SERPL-MCNC: 0.6 NG/ML (ref 0–4)
RBC # BLD AUTO: 4.02 X10E6/UL (ref 4.14–5.8)
SODIUM SERPL-SCNC: 139 MMOL/L (ref 134–144)
TRIGL SERPL-MCNC: 130 MG/DL (ref 0–149)
TSH SERPL DL<=0.005 MIU/L-ACNC: 1.08 UIU/ML (ref 0.45–4.5)
VLDLC SERPL CALC-MCNC: 23 MG/DL (ref 5–40)
WBC # BLD AUTO: 4.5 X10E3/UL (ref 3.4–10.8)

## 2024-09-11 LAB
ACCEPTABLE CREAT UR QL: 126.4 MG/DL (ref 20–300)
AMPHETAMINES UR QL SCN: NEGATIVE NG/ML
BARBITURATES UR QL: NEGATIVE NG/ML
BENZODIAZ UR QL SCN: NEGATIVE NG/ML
BZE UR QL: NEGATIVE NG/ML
CANNABINOIDS UR QL SCN: NEGATIVE NG/ML
ETHANOL UR-MCNC: NEGATIVE %
MEPERIDINE UR QL: NEGATIVE NG/ML
METHADONE UR QL: NEGATIVE NG/ML
OPIATES UR QL SCN: NEGATIVE NG/ML
OXYCODONE+OXYMORPHONE UR QL SCN: NEGATIVE NG/ML
PCP UR QL: NEGATIVE NG/ML
PROPOXYPH UR QL: NEGATIVE NG/ML
TRAMADOL UR QL SCN: NEGATIVE NG/ML
VIT B12 SERPL-MCNC: 186 PG/ML (ref 232–1245)
WRITTEN AUTHORIZATION: NORMAL

## 2025-02-03 ENCOUNTER — OFFICE VISIT (OUTPATIENT)
Dept: FAMILY MEDICINE CLINIC | Facility: CLINIC | Age: 60
End: 2025-02-03
Payer: COMMERCIAL

## 2025-02-03 VITALS
HEART RATE: 95 BPM | DIASTOLIC BLOOD PRESSURE: 80 MMHG | HEIGHT: 70 IN | WEIGHT: 171.8 LBS | TEMPERATURE: 98.3 F | SYSTOLIC BLOOD PRESSURE: 142 MMHG | OXYGEN SATURATION: 96 % | BODY MASS INDEX: 24.6 KG/M2

## 2025-02-03 DIAGNOSIS — R79.89 LOW VITAMIN B12 LEVEL: ICD-10-CM

## 2025-02-03 DIAGNOSIS — M25.552 LEFT HIP PAIN: ICD-10-CM

## 2025-02-03 DIAGNOSIS — M51.361 DEGENERATION OF INTERVERTEBRAL DISC OF LUMBAR REGION WITH LOWER EXTREMITY PAIN: Primary | ICD-10-CM

## 2025-02-03 PROCEDURE — 99213 OFFICE O/P EST LOW 20 MIN: CPT | Performed by: FAMILY MEDICINE

## 2025-02-03 RX ORDER — UBIDECARENONE 75 MG
50 CAPSULE ORAL DAILY
COMMUNITY

## 2025-02-03 NOTE — PROGRESS NOTES
"Chief Complaint  referral to neurology  and Back Pain    Subjective        Jag Roche presents to [facility]  History of Present Illness  History of Present Illness  The patient is a 59-year-old male who presents for discussion about his back pain.    He has been experiencing persistent back pain, which he attributes to his known diagnosis of lumbar degenerative disc disease. He reports difficulty in sleeping due to the pain and has noticed a tendency to clench his teeth. He has previously sought treatment at the [facility], where he received steroid injections. He is considering a repeat of this treatment but is uncertain about the need for a new MRI. Dr. Brown has indicated that additional imaging is required. He has not undergone any surgical interventions for his condition. He reports no loss of bowel or bladder control and no foot drop. He also reports discomfort in his right foot, but the primary issue appears to be with his left leg.    He has been experiencing pain in his left hip for approximately 2 to 3 weeks. He is uncertain about the presence of sciatica on the left side.    He has been off vitamin B12 for 2 to 3 months.    MEDICATIONS  Current: Vitamin B12       Objective   Vital Signs:  /80   Pulse 95   Temp 98.3 °F (36.8 °C)   Ht 177.8 cm (70\")   Wt 77.9 kg (171 lb 12.8 oz)   SpO2 96%   BMI 24.65 kg/m²   Estimated body mass index is 24.65 kg/m² as calculated from the following:    Height as of this encounter: 177.8 cm (70\").    Weight as of this encounter: 77.9 kg (171 lb 12.8 oz).       BMI is within normal parameters. No other follow-up for BMI required.      Physical Exam   Physical Exam  The patient is alert and shows no signs of acute distress.  The left trochanter is nontender with full range of motion. Strength in both extremities is 5 out of 5.       Result Review :          Results  Imaging  MRI from 2021 showed severe disc space narrowing " at L5-S1.              Assessment and Plan     Diagnoses and all orders for this visit:    1. Degeneration of intervertebral disc of lumbar region with lower extremity pain (Primary)  -     Cancel: XR Spine Lumbar 2 or 3 View; Future  -     Ambulatory Referral to Pain Management  -     MRI Lumbar Spine Without Contrast; Future    2. Left hip pain  -     XR Hip With or Without Pelvis 2 - 3 View Left; Future  -     Cancel: XR Spine Lumbar 2 or 3 View; Future  -     Ambulatory Referral to Pain Management  -     MRI Lumbar Spine Without Contrast; Future    3. Low vitamin B12 level  -     Vitamin B12  -     CBC & Differential      Assessment & Plan  1. Lumbar degenerative disc disease.  He has a history of lumbar degenerative disc disease with chronic back pain. An MRI from 2021 showed severe disc space narrowing at L5-S1. He has previously received steroid injections for pain management. A referral to pain management will be initiated. An MRIof the back will be ordered to assess the current condition.    2. Left hip pain.  He reports left hip pain for the past 2-3 weeks. The pain is mostly on the outside of the hip. An x-ray of the left hip will be ordered to evaluate the cause of the pain.    3. Vitamin B12 deficiency.  He has been off vitamin B12 supplements for the past 2-3 months. A vitamin B12 level test will be conducted to assess the current status.    Follow-up  The patient will follow up pending the results.    PROCEDURE  The patient has previously received steroid injections for his back pain.            Follow Up     Return for Next scheduled follow up.  Patient was given instructions and counseling regarding his condition or for health maintenance advice. Please see specific information pulled into the AVS if appropriate.    Patient or patient representative verbalized consent for the use of Ambient Listening during the visit with  Meredith Lea Kehrer, MD for chart documentation. 2/4/2025  12:34 EST

## 2025-02-04 LAB
BASOPHILS # BLD AUTO: 0 X10E3/UL (ref 0–0.2)
BASOPHILS NFR BLD AUTO: 1 %
EOSINOPHIL # BLD AUTO: 0.1 X10E3/UL (ref 0–0.4)
EOSINOPHIL NFR BLD AUTO: 2 %
ERYTHROCYTE [DISTWIDTH] IN BLOOD BY AUTOMATED COUNT: 12.4 % (ref 11.6–15.4)
HCT VFR BLD AUTO: 41.2 % (ref 37.5–51)
HGB BLD-MCNC: 13.8 G/DL (ref 13–17.7)
IMM GRANULOCYTES # BLD AUTO: 0 X10E3/UL (ref 0–0.1)
IMM GRANULOCYTES NFR BLD AUTO: 0 %
LYMPHOCYTES # BLD AUTO: 1.5 X10E3/UL (ref 0.7–3.1)
LYMPHOCYTES NFR BLD AUTO: 27 %
MCH RBC QN AUTO: 33.3 PG (ref 26.6–33)
MCHC RBC AUTO-ENTMCNC: 33.5 G/DL (ref 31.5–35.7)
MCV RBC AUTO: 99 FL (ref 79–97)
MONOCYTES # BLD AUTO: 0.4 X10E3/UL (ref 0.1–0.9)
MONOCYTES NFR BLD AUTO: 7 %
NEUTROPHILS # BLD AUTO: 3.5 X10E3/UL (ref 1.4–7)
NEUTROPHILS NFR BLD AUTO: 63 %
PLATELET # BLD AUTO: 284 X10E3/UL (ref 150–450)
RBC # BLD AUTO: 4.15 X10E6/UL (ref 4.14–5.8)
VIT B12 SERPL-MCNC: 809 PG/ML (ref 232–1245)
WBC # BLD AUTO: 5.6 X10E3/UL (ref 3.4–10.8)

## 2025-03-03 ENCOUNTER — HOSPITAL ENCOUNTER (OUTPATIENT)
Dept: GENERAL RADIOLOGY | Facility: HOSPITAL | Age: 60
Discharge: HOME | End: 2025-03-03
Payer: COMMERCIAL

## 2025-03-03 ENCOUNTER — HOSPITAL ENCOUNTER (OUTPATIENT)
Dept: MRI IMAGING | Facility: HOSPITAL | Age: 60
Discharge: HOME | End: 2025-03-03
Payer: COMMERCIAL

## 2025-03-03 DIAGNOSIS — M51.361 DEGENERATION OF INTERVERTEBRAL DISC OF LUMBAR REGION WITH LOWER EXTREMITY PAIN: ICD-10-CM

## 2025-03-03 DIAGNOSIS — M25.552 LEFT HIP PAIN: ICD-10-CM

## 2025-03-03 PROCEDURE — 73502 X-RAY EXAM HIP UNI 2-3 VIEWS: CPT

## 2025-03-03 PROCEDURE — 72148 MRI LUMBAR SPINE W/O DYE: CPT

## 2025-03-05 DIAGNOSIS — M25.552 LEFT HIP PAIN: Primary | ICD-10-CM

## 2025-03-10 ENCOUNTER — OFFICE VISIT (OUTPATIENT)
Dept: FAMILY MEDICINE CLINIC | Facility: CLINIC | Age: 60
End: 2025-03-10
Payer: COMMERCIAL

## 2025-03-10 VITALS
SYSTOLIC BLOOD PRESSURE: 130 MMHG | DIASTOLIC BLOOD PRESSURE: 64 MMHG | OXYGEN SATURATION: 96 % | HEART RATE: 64 BPM | HEIGHT: 70 IN | WEIGHT: 170.2 LBS | BODY MASS INDEX: 24.37 KG/M2

## 2025-03-10 DIAGNOSIS — M25.552 LEFT HIP PAIN: ICD-10-CM

## 2025-03-10 DIAGNOSIS — M48.061 SPINAL STENOSIS OF LUMBAR REGION, UNSPECIFIED WHETHER NEUROGENIC CLAUDICATION PRESENT: ICD-10-CM

## 2025-03-10 DIAGNOSIS — Z79.899 LONG-TERM USE OF HIGH-RISK MEDICATION: ICD-10-CM

## 2025-03-10 DIAGNOSIS — F43.22 ADJUSTMENT DISORDER WITH ANXIETY: ICD-10-CM

## 2025-03-10 DIAGNOSIS — M54.12 CERVICAL NEURITIS: Primary | ICD-10-CM

## 2025-03-10 PROCEDURE — 99214 OFFICE O/P EST MOD 30 MIN: CPT | Performed by: FAMILY MEDICINE

## 2025-03-10 RX ORDER — GABAPENTIN 600 MG/1
600 TABLET ORAL 2 TIMES DAILY
Qty: 180 TABLET | Refills: 1 | Status: SHIPPED | OUTPATIENT
Start: 2025-03-10

## 2025-03-10 NOTE — PROGRESS NOTES
"Chief Complaint  Sciatica (Follow up on MRI of hip. )    Subjective        Jag Roche presents to [facility]  History of Present Illness  History of Present Illness  The patient is a 59-year-old male who presents for a 6-month follow-up on cervical neuritis and anxiety.    He has been managing his cervical neuritis with gabapentin, taking one 600 mg tablet daily, which he reports as beneficial. He experiences numbness in all fingers of one hand but does not experience any itching. He is seeking a refill of his gabapentin prescription.    He has been managing his anxiety with Lexapro, which he reports as effective in stabilizing his mood. He recently had his Lexapro prescription refilled.    Last time I saw him we ordered x-rays of his hip and right of his lumbar spine.  He has an appointment scheduled with Dr. Shah for his hip, although he has not yet received a call from the office. He is uncertain about the necessity of hip replacement surgery, suspecting that his back condition may be causing pain radiating down his sciatic nerve. He describes his back pain as bothersome, but it is the hip pain that he finds debilitating, leading to balance loss and strength weakness, and occasionally reducing him to tears. However, he notes that his pain levels can vary significantly from day to day.    He underwent an epidural procedure three weeks ago, which provided some relief. He is scheduled for an ablation procedure in May. He reports that his condition fluctuates, with some days being manageable while others are challenging. He does not feel prepared to consult a surgeon at this time.    MEDICATIONS  gabapentin, Lexapro       Objective   Vital Signs:  /64   Pulse 64   Ht 177.8 cm (70\")   Wt 77.2 kg (170 lb 3.2 oz)   SpO2 96%   BMI 24.42 kg/m²   Estimated body mass index is 24.42 kg/m² as calculated from the following:    Height as of this encounter: 177.8 cm (70\").    Weight " as of this encounter: 77.2 kg (170 lb 3.2 oz).       BMI is within normal parameters. No other follow-up for BMI required.      Physical Exam  Constitutional:       General: He is not in acute distress.     Appearance: He is well-developed.   HENT:      Head: Normocephalic and atraumatic.   Eyes:      Conjunctiva/sclera: Conjunctivae normal.      Pupils: Pupils are equal, round, and reactive to light.   Pulmonary:      Effort: Pulmonary effort is normal. No respiratory distress.   Neurological:      Mental Status: He is alert and oriented to person, place, and time.   Psychiatric:         Mood and Affect: Mood normal.         Behavior: Behavior normal.        Physical Exam         Result Review :          Results  Laboratory Studies  Vitamin D level was good.    Imaging  X-ray of hip showed some arthritis. MRI of lumbar spine showed a lot of canal stenosis.   CONTROLLED SUBSTANCE AGREEMENT - SCAN - CSA GABAPENTIN, 9/9/24 (09/09/2024)   Drug Profile [id number] - Urine, Clean Catch (09/09/2024 15:38)          Assessment and Plan     Diagnoses and all orders for this visit:    1. Cervical neuritis (Primary)    2. Adjustment disorder with anxiety    3. Spinal stenosis of lumbar region, unspecified whether neurogenic claudication present      Assessment & Plan  1. Cervical neuritis.  He is currently taking gabapentin 600 mg once daily for cervical neuritis. He reports that the medication helps with itching but does not alleviate numbness. A prescription refill for gabapentin will be provided.    2. Anxiety.  He is on Lexapro for anxiety and reports that it is helping his mood. He recently had his prescription refilled.    3. Hip pain.  The x-ray of his hip showed some arthritis and joint narrowing. A referral to Dr. Shah, an orthopedist, has been made for a more thorough examination of his hip. He is advised to follow up with the orthopedist to determine if the hip is contributing to his pain.    4. Severe spinal  stenosis.  The MRI of his lumbar spine showed significant canal stenosis. He had an epidural 2-3 weeks ago, which provided some relief. He is scheduled for an ablation in May. He is advised to consider seeing a surgeon if his symptoms become unmanageable.    5. Health maintenance.  His vitamin D levels were within the normal range during the last assessment. He is advised to continue taking a maintenance dose of vitamin D at 1000 mcg daily. A drug screen will be conducted today.    Follow-up  The patient will follow up in 6 months for his annual visit.    PROCEDURE  The patient underwent an epidural procedure three weeks ago, which provided some relief.            Follow Up     No follow-ups on file.  Patient was given instructions and counseling regarding his condition or for health maintenance advice. Please see specific information pulled into the AVS if appropriate.    Patient or patient representative verbalized consent for the use of Ambient Listening during the visit with  Meredith Lea Kehrer, MD for chart documentation. 3/10/2025  15:53 EDT      Answers submitted by the patient for this visit:  Lower Extremity Injury Questionnaire (Submitted on 3/10/2025)  Chief Complaint: Extremity pain  Injury: No

## 2025-03-11 LAB
ACCEPTABLE CREAT UR QL: 262.1 MG/DL (ref 20–300)
AMPHETAMINES UR QL SCN: NEGATIVE NG/ML
BARBITURATES UR QL: NEGATIVE NG/ML
BENZODIAZ UR QL SCN: NEGATIVE NG/ML
BZE UR QL: NEGATIVE NG/ML
CANNABINOIDS UR QL SCN: NEGATIVE NG/ML
ETHANOL UR-MCNC: NEGATIVE %
MEPERIDINE UR QL: NEGATIVE NG/ML
METHADONE UR QL: NEGATIVE NG/ML
OPIATES UR QL SCN: NEGATIVE NG/ML
OXYCODONE+OXYMORPHONE UR QL SCN: NEGATIVE NG/ML
PCP UR QL: NEGATIVE NG/ML
PROPOXYPH UR QL: NEGATIVE NG/ML
TRAMADOL UR QL SCN: NEGATIVE NG/ML

## 2025-04-17 DIAGNOSIS — F43.22 ADJUSTMENT DISORDER WITH ANXIETY: ICD-10-CM

## 2025-04-17 RX ORDER — ESCITALOPRAM OXALATE 10 MG/1
10 TABLET ORAL DAILY
Qty: 90 TABLET | Refills: 1 | Status: SHIPPED | OUTPATIENT
Start: 2025-04-17

## 2025-04-17 NOTE — TELEPHONE ENCOUNTER
Rx Refill Note  Requested Prescriptions     Pending Prescriptions Disp Refills    escitalopram (LEXAPRO) 10 MG tablet [Pharmacy Med Name: Escitalopram Oxalate 10 MG Oral Tablet] 90 tablet 0     Sig: Take 1 tablet by mouth once daily      Last office visit with prescribing clinician: 3/10/2025   Last telemedicine visit with prescribing clinician: Visit date not found   Next office visit with prescribing clinician: Visit date not found                         Would you like a call back once the refill request has been completed: [] Yes [] No    If the office needs to give you a call back, can they leave a voicemail: [] Yes [] No    Alpa Hyatt MA  04/17/25, 16:22 EDT